# Patient Record
Sex: MALE | Race: WHITE | NOT HISPANIC OR LATINO | Employment: OTHER | URBAN - METROPOLITAN AREA
[De-identification: names, ages, dates, MRNs, and addresses within clinical notes are randomized per-mention and may not be internally consistent; named-entity substitution may affect disease eponyms.]

---

## 2017-09-06 ENCOUNTER — ALLSCRIPTS OFFICE VISIT (OUTPATIENT)
Dept: OTHER | Facility: OTHER | Age: 77
End: 2017-09-06

## 2017-09-06 LAB
BILIRUB UR QL STRIP: NORMAL
CLARITY UR: NORMAL
COLOR UR: YELLOW
GLUCOSE (HISTORICAL): NORMAL
HGB UR QL STRIP.AUTO: NORMAL
KETONES UR STRIP-MCNC: NORMAL MG/DL
LEUKOCYTE ESTERASE UR QL STRIP: NORMAL
NITRITE UR QL STRIP: NORMAL
PH UR STRIP.AUTO: 6 [PH]
PROT UR STRIP-MCNC: NORMAL MG/DL
SP GR UR STRIP.AUTO: 1.02
UROBILINOGEN UR QL STRIP.AUTO: 0.2

## 2018-01-13 VITALS
DIASTOLIC BLOOD PRESSURE: 64 MMHG | WEIGHT: 202 LBS | HEIGHT: 65 IN | SYSTOLIC BLOOD PRESSURE: 122 MMHG | BODY MASS INDEX: 33.66 KG/M2

## 2018-08-20 DIAGNOSIS — N13.8 BPH WITH OBSTRUCTION/LOWER URINARY TRACT SYMPTOMS: Primary | ICD-10-CM

## 2018-08-20 DIAGNOSIS — N40.1 BPH WITH OBSTRUCTION/LOWER URINARY TRACT SYMPTOMS: Primary | ICD-10-CM

## 2018-08-20 RX ORDER — FINASTERIDE 5 MG/1
TABLET, FILM COATED ORAL
Qty: 90 TABLET | Refills: 3 | Status: SHIPPED | OUTPATIENT
Start: 2018-08-20 | End: 2018-09-05 | Stop reason: SDUPTHER

## 2018-08-27 RX ORDER — CETIRIZINE HYDROCHLORIDE 10 MG/1
10 TABLET ORAL
COMMUNITY
Start: 2014-06-25

## 2018-08-27 RX ORDER — SIMVASTATIN 20 MG
10 TABLET ORAL DAILY
COMMUNITY
Start: 2018-06-22

## 2018-08-27 RX ORDER — MONTELUKAST SODIUM 10 MG/1
TABLET ORAL DAILY
COMMUNITY
Start: 2018-07-18

## 2018-08-27 RX ORDER — FLUTICASONE PROPIONATE 50 MCG
SPRAY, SUSPENSION (ML) NASAL
COMMUNITY
Start: 2018-08-10

## 2018-08-27 RX ORDER — MULTIVIT-MIN/IRON/FOLIC ACID/K 18-600-40
CAPSULE ORAL DAILY
COMMUNITY

## 2018-08-27 RX ORDER — ALBUTEROL SULFATE 2.5 MG/3ML
2.5 SOLUTION RESPIRATORY (INHALATION) EVERY 4 HOURS
COMMUNITY
Start: 2018-04-06 | End: 2019-04-06

## 2018-08-27 RX ORDER — FLUTICASONE PROPIONATE 44 MCG
AEROSOL WITH ADAPTER (GRAM) INHALATION
COMMUNITY
Start: 2018-08-10

## 2018-09-05 ENCOUNTER — OFFICE VISIT (OUTPATIENT)
Dept: UROLOGY | Facility: MEDICAL CENTER | Age: 78
End: 2018-09-05
Payer: MEDICARE

## 2018-09-05 VITALS
DIASTOLIC BLOOD PRESSURE: 60 MMHG | WEIGHT: 187 LBS | BODY MASS INDEX: 31.16 KG/M2 | SYSTOLIC BLOOD PRESSURE: 126 MMHG | HEIGHT: 65 IN

## 2018-09-05 DIAGNOSIS — N13.8 BPH WITH OBSTRUCTION/LOWER URINARY TRACT SYMPTOMS: Primary | ICD-10-CM

## 2018-09-05 DIAGNOSIS — N40.1 BPH WITH OBSTRUCTION/LOWER URINARY TRACT SYMPTOMS: Primary | ICD-10-CM

## 2018-09-05 LAB
SL AMB  POCT GLUCOSE, UA: NORMAL
SL AMB LEUKOCYTE ESTERASE,UA: NORMAL
SL AMB POCT BILIRUBIN,UA: NORMAL
SL AMB POCT BLOOD,UA: NORMAL
SL AMB POCT CLARITY,UA: CLEAR
SL AMB POCT COLOR,UA: YELLOW
SL AMB POCT KETONES,UA: NORMAL
SL AMB POCT NITRITE,UA: NORMAL
SL AMB POCT PH,UA: 7
SL AMB POCT SPECIFIC GRAVITY,UA: 1.02
SL AMB POCT URINE PROTEIN: NORMAL
SL AMB POCT UROBILINOGEN: 0.2

## 2018-09-05 PROCEDURE — 81003 URINALYSIS AUTO W/O SCOPE: CPT | Performed by: UROLOGY

## 2018-09-05 PROCEDURE — 99214 OFFICE O/P EST MOD 30 MIN: CPT | Performed by: UROLOGY

## 2018-09-05 RX ORDER — FINASTERIDE 5 MG/1
5 TABLET, FILM COATED ORAL DAILY
Qty: 90 TABLET | Refills: 3 | Status: SHIPPED | OUTPATIENT
Start: 2018-09-05 | End: 2019-11-05 | Stop reason: SDUPTHER

## 2018-09-05 NOTE — PATIENT INSTRUCTIONS
Finasteride (By mouth)   Finasteride (fin-AS-ter-colin)  Treats benign prostatic hyperplasia (enlarged prostate)  Also treats hair loss in men  Brand Name(s): Propecia, Proscar   There may be other brand names for this medicine  When This Medicine Should Not Be Used: You should not use this medicine if you have had an allergic reaction to finasteride  Women and children should not use this medicine  How to Use This Medicine:   Tablet  · Your doctor will tell you how much medicine to use  Do not use more than directed  · You may take this medicine with or without food  · Take this medicine at the same time each day  · For hair loss, you may need to take this medicine for 3 months or longer before you see an effect  · For an enlarged prostate, you may need to take this medicine for up to 6 months to see the full effect  · Read and follow the patient instructions that come with this medicine  Talk to your doctor or pharmacist if you have any questions  If a dose is missed:   · If you miss a dose or forget to take your medicine, skip the missed dose and take your next regular dose  Do not use extra medicine to make up for a missed dose  How to Store and Dispose of This Medicine:   · Store the medicine in a closed container at room temperature, away from heat, moisture, and direct light  · Ask your pharmacist, doctor, or health caregiver about the best way to dispose of any outdated medicine or medicine no longer needed  · Keep all medicine out of the reach of children  Never share your medicine with anyone  Drugs and Foods to Avoid:      Ask your doctor or pharmacist before using any other medicine, including over-the-counter medicines, vitamins, and herbal products  Warnings While Using This Medicine:   · Women who are pregnant or may become pregnant should avoid handling or touching this medicine  This medicine can get into the body through the skin and may harm an unborn male baby   If any of this medicine gets on the skin of a woman, wash the area immediately with soap and water  · Make sure your doctor knows if you have liver disease or severe problems urinating  · This medicine will not cure an enlarged prostate problem or male pattern baldness  Once you stop using this medicine, the prostate will begin to grow again within a few months and any new hair will be lost within 12 months  · This medicine may cause changes to the breast tissue  Tell your doctor if you have any lumps, pain, tenderness, or an enlargement of the breasts while using this medicine  · This medicine will not prevent prostate cancer but may increase your risk of developing high-grade prostate cancer  Tell your doctor if you have concerns about this risk  · This medicine may affect the results of the prostate specific antigen (PSA) test, which may be used to detect prostate cancer  Make sure you tell all of your doctors that you are using this medicine  · This medicine may cause a decrease in the amount of semen you ejaculate during sex  This will not affect your sperm count or your ability to have children  · Your doctor will check your progress and the effects of this medicine at regular visits  Keep all appointments  Blood tests may be needed to check for unwanted effects  Possible Side Effects While Using This Medicine:   Call your doctor right away if you notice any of these side effects:  · Allergic reaction: Itching or hives, swelling in your face or hands, swelling or tingling in your mouth or throat, chest tightness, trouble breathing  · Breast swelling, pain, or tenderness  · Lightheadedness, dizziness, or fainting  · Lumps in the breast or under the arm  · Pain in the testicles  · Swelling in your hands, ankles, or feet  If you notice these less serious side effects, talk with your doctor:   · Decrease in the amount of semen  · Loss of interest in sex or the ability to have sex (impotence)    · Skin rash   · Sleepiness or unusual drowsiness  · Stuffy or runny nose  · Trouble having or keeping an erection  · Weakness  If you notice other side effects that you think are caused by this medicine, tell your doctor  Call your doctor for medical advice about side effects  You may report side effects to FDA at 9-925-FDA-1365  © 2017 [employer] Information is for End User's use only and may not be sold, redistributed or otherwise used for commercial purposes  The above information is an  only  It is not intended as medical advice for individual conditions or treatments  Talk to your doctor, nurse or pharmacist before following any medical regimen to see if it is safe and effective for you

## 2018-09-05 NOTE — LETTER
September 5, 2018     Laura Gunter [address] [address]    Patient: Murray Melendrez   YOB: 1940   Date of Visit: 9/5/2018       Dear Dr Katharina Long:    Thank you for referring Murray Melendrez to me for evaluation  Below are my notes for this consultation  If you have questions, please do not hesitate to call me  I look forward to following your patient along with you  Sincerely,        Marvis Bence, MD        CC: No Recipients  Marvis Bence, MD  9/5/2018 10:06 AM  Sign at close encounter  Assessment/Plan:    BPH with obstruction/lower urinary tract symptoms  The patient is pleased with his voiding pattern  His AUA symptom score is 6  Urinalysis is negative  PSA was 1 0 and serum creatinine normal on August 1, 2018  His prescription for finasteride was refilled  He will return in 1 year and we will recheck PSA at that time  Diagnoses and all orders for this visit:    BPH with obstruction/lower urinary tract symptoms  -     POCT urine dip auto non-scope  -     finasteride (PROSCAR) 5 mg tablet; Take 1 tablet (5 mg total) by mouth daily  -     PSA Total, Diagnostic; Future          Subjective:      Patient ID: Murray Melendrez is a 65 y o  male  Chief complaint:  Lower tract symptoms      Benign Prostatic Hypertrophy   This is a chronic problem  The current episode started more than 1 year ago  The problem is unchanged  Irritative symptoms include nocturia and urgency  Irritative symptoms do not include frequency  Obstructive symptoms include a slower stream  Obstructive symptoms do not include dribbling, incomplete emptying, an intermittent stream, straining or a weak stream  Pertinent negatives include no chills, dysuria, genital pain, hematuria, hesitancy, nausea or vomiting  AUA score is 0-7  Nothing aggravates the symptoms  Past treatments include finasteride  The treatment provided moderate relief   He has been using treatment for 2 or more years  His urgency is mild and there is no incontinence  Occasionally he notes and itching in the urethra at night  This usually resolves on its own  Nocturia is variable from 0-2 X  He is pleased with his voiding pattern at this time  The following portions of the patient's history were reviewed and updated as appropriate: allergies, current medications, past family history, past medical history, past social history, past surgical history and problem list     Review of Systems   Constitutional: Negative for chills, diaphoresis, fatigue and fever  HENT: Negative  Eyes: Negative  Respiratory: Negative  Cardiovascular: Negative  Gastrointestinal: Negative  Negative for nausea and vomiting  Endocrine: Negative  Genitourinary: Positive for nocturia and urgency  Negative for dysuria, frequency, hematuria, hesitancy and incomplete emptying  Musculoskeletal: Negative  Skin: Negative  Allergic/Immunologic: Negative  Neurological: Negative  Hematological: Negative  Psychiatric/Behavioral: Negative  Objective:      /60 (BP Location: Left arm, Patient Position: Sitting)   Ht 5' 5" (1 651 m)   Wt 84 8 kg (187 lb)   BMI 31 12 kg/m²           Physical Exam   Constitutional: He is oriented to person, place, and time  He appears well-developed and well-nourished  HENT:   Head: Normocephalic and atraumatic  Eyes: Conjunctivae are normal    Neck: Neck supple  Cardiovascular: Normal rate  Pulmonary/Chest: Effort normal    Abdominal: Soft  Bowel sounds are normal  He exhibits no distension and no mass  There is no tenderness  There is no rebound, no guarding and no CVA tenderness  No hernia   Genitourinary: Rectum normal, testes normal and penis normal  Right testis shows no mass  Left testis shows no mass  No phimosis or hypospadias  Genitourinary Comments: Testes descended and normal to palpation    Prostate 2 5 X enlarged and palpably benign     No nodule or induration is noted  The prostate is symmetric  Musculoskeletal: He exhibits no edema  Neurological: He is alert and oriented to person, place, and time  Skin: Skin is warm and dry  Psychiatric: He has a normal mood and affect  His behavior is normal  Judgment and thought content normal    Vitals reviewed  AUA SYMPTOM SCORE      Most Recent Value   AUA SYMPTOM SCORE   How often have you had a sensation of not emptying your bladder completely after you finished urinating? 1   How often have you had to urinate again less than two hours after you finished urinating? 1   How often have you found you stopped and started again several times when you urinate? 1   How often have you found it difficult to postpone urination? 0   How often have you had a weak urinary stream?  1   How often have you had to push or strain to begin urination? 0   How many times did you most typically get up to urinate from the time you went to bed at night until the time you got up in the morning?   2   Quality of Life: If you were to spend the rest of your life with your urinary condition just the way it is now, how would you feel about that?  0   AUA SYMPTOM SCORE  6

## 2018-09-05 NOTE — PROGRESS NOTES
Assessment/Plan:    BPH with obstruction/lower urinary tract symptoms  The patient is pleased with his voiding pattern  His AUA symptom score is 6  Urinalysis is negative  PSA was 1 0 and serum creatinine normal on August 1, 2018  His prescription for finasteride was refilled  He will return in 1 year and we will recheck PSA at that time  Diagnoses and all orders for this visit:    BPH with obstruction/lower urinary tract symptoms  -     POCT urine dip auto non-scope  -     finasteride (PROSCAR) 5 mg tablet; Take 1 tablet (5 mg total) by mouth daily  -     PSA Total, Diagnostic; Future          Subjective:      Patient ID: Emanuel Fernandez is a 65 y o  male  Chief complaint:  Lower tract symptoms      Benign Prostatic Hypertrophy   This is a chronic problem  The current episode started more than 1 year ago  The problem is unchanged  Irritative symptoms include nocturia and urgency  Irritative symptoms do not include frequency  Obstructive symptoms include a slower stream  Obstructive symptoms do not include dribbling, incomplete emptying, an intermittent stream, straining or a weak stream  Pertinent negatives include no chills, dysuria, genital pain, hematuria, hesitancy, nausea or vomiting  AUA score is 0-7  Nothing aggravates the symptoms  Past treatments include finasteride  The treatment provided moderate relief  He has been using treatment for 2 or more years  His urgency is mild and there is no incontinence  Occasionally he notes and itching in the urethra at night  This usually resolves on its own  Nocturia is variable from 0-2 X  He is pleased with his voiding pattern at this time      The following portions of the patient's history were reviewed and updated as appropriate: allergies, current medications, past family history, past medical history, past social history, past surgical history and problem list     Review of Systems   Constitutional: Negative for chills, diaphoresis, fatigue and fever    HENT: Negative  Eyes: Negative  Respiratory: Negative  Cardiovascular: Negative  Gastrointestinal: Negative  Negative for nausea and vomiting  Endocrine: Negative  Genitourinary: Positive for nocturia and urgency  Negative for dysuria, frequency, hematuria, hesitancy and incomplete emptying  Musculoskeletal: Negative  Skin: Negative  Allergic/Immunologic: Negative  Neurological: Negative  Hematological: Negative  Psychiatric/Behavioral: Negative  Objective:      /60 (BP Location: Left arm, Patient Position: Sitting)   Ht 5' 5" (1 651 m)   Wt 84 8 kg (187 lb)   BMI 31 12 kg/m²          Physical Exam   Constitutional: He is oriented to person, place, and time  He appears well-developed and well-nourished  HENT:   Head: Normocephalic and atraumatic  Eyes: Conjunctivae are normal    Neck: Neck supple  Cardiovascular: Normal rate  Pulmonary/Chest: Effort normal    Abdominal: Soft  Bowel sounds are normal  He exhibits no distension and no mass  There is no tenderness  There is no rebound, no guarding and no CVA tenderness  No hernia   Genitourinary: Rectum normal, testes normal and penis normal  Right testis shows no mass  Left testis shows no mass  No phimosis or hypospadias  Genitourinary Comments: Testes descended and normal to palpation    Prostate 2 5 X enlarged and palpably benign  No nodule or induration is noted  The prostate is symmetric  Musculoskeletal: He exhibits no edema  Neurological: He is alert and oriented to person, place, and time  Skin: Skin is warm and dry  Psychiatric: He has a normal mood and affect  His behavior is normal  Judgment and thought content normal    Vitals reviewed  AUA SYMPTOM SCORE      Most Recent Value   AUA SYMPTOM SCORE   How often have you had a sensation of not emptying your bladder completely after you finished urinating?   1   How often have you had to urinate again less than two hours after you finished urinating? 1   How often have you found you stopped and started again several times when you urinate? 1   How often have you found it difficult to postpone urination? 0   How often have you had a weak urinary stream?  1   How often have you had to push or strain to begin urination? 0   How many times did you most typically get up to urinate from the time you went to bed at night until the time you got up in the morning?   2   Quality of Life: If you were to spend the rest of your life with your urinary condition just the way it is now, how would you feel about that?  0   AUA SYMPTOM SCORE  6

## 2018-09-05 NOTE — ASSESSMENT & PLAN NOTE
The patient is pleased with his voiding pattern  His AUA symptom score is 6  Urinalysis is negative  PSA was 1 0 and serum creatinine normal on August 1, 2018  His prescription for finasteride was refilled  He will return in 1 year and we will recheck PSA at that time

## 2018-09-06 DIAGNOSIS — N40.1 ENLARGED PROSTATE WITH LOWER URINARY TRACT SYMPTOMS (LUTS): ICD-10-CM

## 2019-07-23 LAB — HBA1C MFR BLD HPLC: 6.1 %

## 2019-11-05 ENCOUNTER — OFFICE VISIT (OUTPATIENT)
Dept: UROLOGY | Facility: MEDICAL CENTER | Age: 79
End: 2019-11-05
Payer: MEDICARE

## 2019-11-05 VITALS
WEIGHT: 194 LBS | HEART RATE: 75 BPM | SYSTOLIC BLOOD PRESSURE: 118 MMHG | DIASTOLIC BLOOD PRESSURE: 58 MMHG | HEIGHT: 66 IN | BODY MASS INDEX: 31.18 KG/M2

## 2019-11-05 DIAGNOSIS — N40.1 BPH WITH OBSTRUCTION/LOWER URINARY TRACT SYMPTOMS: Primary | ICD-10-CM

## 2019-11-05 DIAGNOSIS — N13.8 BPH WITH OBSTRUCTION/LOWER URINARY TRACT SYMPTOMS: Primary | ICD-10-CM

## 2019-11-05 PROCEDURE — 99214 OFFICE O/P EST MOD 30 MIN: CPT | Performed by: UROLOGY

## 2019-11-05 PROCEDURE — 81003 URINALYSIS AUTO W/O SCOPE: CPT | Performed by: UROLOGY

## 2019-11-05 RX ORDER — VARENICLINE TARTRATE 25 MG
KIT ORAL
COMMUNITY
Start: 2019-10-07 | End: 2019-11-06

## 2019-11-05 RX ORDER — ALBUTEROL SULFATE 90 UG/1
2 AEROSOL, METERED RESPIRATORY (INHALATION) EVERY 4 HOURS PRN
COMMUNITY

## 2019-11-05 RX ORDER — FINASTERIDE 5 MG/1
5 TABLET, FILM COATED ORAL DAILY
Qty: 90 TABLET | Refills: 3 | Status: SHIPPED | OUTPATIENT
Start: 2019-11-05 | End: 2020-09-21

## 2019-11-05 NOTE — LETTER
November 5, 2019     Brigida Torres MD  [address] [address]    Patient: Jorge Rodríguez   YOB: 1940   Date of Visit: 11/5/2019       Dear Dr Cait Foster:    Thank you for referring Jorge Rodríguez to me for evaluation  Below are my notes for this consultation  If you have questions, please do not hesitate to call me  I look forward to following your patient along with you  Sincerely,        Raji Bravo MD        CC: No Recipients  Raji Bravo MD  11/5/2019  3:07 PM  Sign at close encounter  Assessment/Plan:    BPH with obstruction/lower urinary tract symptoms  AUA symptom score is 8 on finasteride  He is pleased with his voiding pattern  Urinalysis is negative  PSA is normal at 1 01 (July 23, 2019)  The patient is content to continue medical therapy with finasteride  At his request we will repeat a PSA in 1 year  He will return in 1 year for follow-up  He will call should there be any worsening of his urinary symptoms  Diagnoses and all orders for this visit:    BPH with obstruction/lower urinary tract symptoms  -     POCT urine dip auto non-scope  -     finasteride (PROSCAR) 5 mg tablet; Take 1 tablet (5 mg total) by mouth daily  -     PSA Total, Diagnostic; Future    Other orders  -     albuterol (PROVENTIL HFA,VENTOLIN HFA) 90 mcg/act inhaler; Inhale 2 puffs every 4 (four) hours as needed  -     varenicline (CHANTIX YIN) 0 5 MG X 11 & 1 MG X 42 tablet; Use as directed  Give with meals and with a full glass of water  Subjective:      Patient ID: Jorge Rodríguez is a 77 y o  male  Benign Prostatic Hypertrophy   This is a chronic problem  The current episode started more than 1 year ago  The problem is unchanged  Irritative symptoms do not include frequency or urgency  Nocturia: Nocturia x 1   Obstructive symptoms include dribbling and a slower stream  Obstructive symptoms do not include incomplete emptying, an intermittent stream, straining or a weak stream  Pertinent negatives include no chills, dysuria, genital pain, hematuria, hesitancy, nausea or vomiting  AUA score is 8-19  His sexual activity is non-contributory to the current illness  Nothing aggravates the symptoms  Past treatments include finasteride  The treatment provided moderate relief  He has been using treatment for 2 or more years  The following portions of the patient's history were reviewed and updated as appropriate: allergies, current medications, past family history, past medical history, past social history, past surgical history and problem list     Review of Systems   Constitutional: Negative  Negative for chills, diaphoresis, fatigue and fever  HENT: Negative  Eyes: Negative  Respiratory: Positive for shortness of breath  Cardiovascular: Negative  Gastrointestinal: Negative  Negative for nausea and vomiting  Endocrine: Negative  Genitourinary: Negative for dysuria, frequency, hematuria, hesitancy, incomplete emptying and urgency  Nocturia: Nocturia x 1  See HPI   Musculoskeletal: Positive for arthralgias  Skin: Negative  Allergic/Immunologic: Negative  Neurological: Negative  Hematological: Negative  Psychiatric/Behavioral: Negative  AUA SYMPTOM SCORE      Most Recent Value   AUA SYMPTOM SCORE   How often have you had a sensation of not emptying your bladder completely after you finished urinating? 0   How often have you had to urinate again less than two hours after you finished urinating? 1   How often have you found you stopped and started again several times when you urinate? 1   How often have you found it difficult to postpone urination? 2   How often have you had a weak urinary stream?  2   How often have you had to push or strain to begin urination? 1   How many times did you most typically get up to urinate from the time you went to bed at night until the time you got up in the morning?   1 Quality of Life: If you were to spend the rest of your life with your urinary condition just the way it is now, how would you feel about that?  1   AUA SYMPTOM SCORE  8        Objective:      /58 (BP Location: Left arm, Patient Position: Sitting, Cuff Size: Adult)   Pulse 75   Ht 5' 6" (1 676 m)   Wt 88 kg (194 lb)   BMI 31 31 kg/m²           Physical Exam   Constitutional: He is oriented to person, place, and time  He appears well-developed and well-nourished  HENT:   Head: Normocephalic and atraumatic  Eyes: Conjunctivae are normal    Neck: Neck supple  Cardiovascular: Normal rate  Pulmonary/Chest: Effort normal    Abdominal: Soft  Bowel sounds are normal  He exhibits no distension and no mass  There is no tenderness  There is no rebound, no guarding and no CVA tenderness  Hernia confirmed negative in the right inguinal area and confirmed negative in the left inguinal area  Genitourinary: Rectum normal, testes normal and penis normal  Right testis shows no mass  Left testis shows no mass  Uncircumcised  No phimosis, paraphimosis, hypospadias, penile erythema or penile tenderness  No discharge found  Genitourinary Comments: Prostate 2 5 X enlarged and palpably benign  No nodule or induration is noted  Musculoskeletal: He exhibits no edema  Neurological: He is alert and oriented to person, place, and time  Skin: Skin is warm and dry  Psychiatric: He has a normal mood and affect  His behavior is normal  Judgment and thought content normal    Vitals reviewed

## 2019-11-05 NOTE — ASSESSMENT & PLAN NOTE
AUA symptom score is 8 on finasteride  He is pleased with his voiding pattern  Urinalysis is negative  PSA is normal at 1 01 (July 23, 2019)  The patient is content to continue medical therapy with finasteride  At his request we will repeat a PSA in 1 year  He will return in 1 year for follow-up  He will call should there be any worsening of his urinary symptoms

## 2019-11-05 NOTE — PATIENT INSTRUCTIONS
Benign Prostatic Hypertrophy   WHAT YOU NEED TO KNOW:   Benign prostatic hypertrophy (BPH) is a condition that causes your prostate gland to grow larger than normal  The prostate gland is the male sex gland that produces a fluid that is part of semen  It is about the size of a walnut and it is located under the bladder  As the prostate grows, it can squeeze the urethra  This can block urine flow and cause urinary problems  DISCHARGE INSTRUCTIONS:   Medicines:   · Alpha blockers: This medicine relaxes the muscles in your prostate and bladder  It may help you urinate more easily  · 5 alpha reductase inhibitors: These medicines block the production of a hormone that causes the prostate to get larger  It may help slow the growth of the prostate or shrink the prostate  · Take your medicine as directed  Contact your healthcare provider if you think your medicine is not helping or if you have side effects  Tell him or her if you are allergic to any medicine  Keep a list of the medicines, vitamins, and herbs you take  Include the amounts, and when and why you take them  Bring the list or the pill bottles to follow-up visits  Carry your medicine list with you in case of an emergency  Follow up with your healthcare provider as directed:  Write down your questions so you remember to ask them during your visits  Manage BPH:   · Do not let your bladder get too full before you empty it  Urinate when you feel the urge  · Limit alcohol  Do not drink large amounts of any liquid at one time  · Decrease the amount of salt you eat  Examples of salty foods are chips, cured meats, and canned soups  Do not use table salt  · Healthcare providers may tell you not to eat spicy foods such as chilli peppers  This may help you find out if spicy food makes your BPH symptoms worse  · You may have sex if you feel well  Contact your healthcare provider if:   · There is a large amount of blood in your urine  · Your signs and symptoms get worse  · You have a fever  · You have questions or concerns about your condition or care  Seek care immediately if:   · You are unable to urinate  · Your bladder feels very full and painful  © 2017 [employer] Information is for End User's use only and may not be sold, redistributed or otherwise used for commercial purposes  All illustrations and images included in CareNotes® are the copyrighted property of [employer]  or [employer]  The above information is an  only  It is not intended as medical advice for individual conditions or treatments  Talk to your doctor, nurse or pharmacist before following any medical regimen to see if it is safe and effective for you

## 2019-11-05 NOTE — PROGRESS NOTES
Assessment/Plan:    BPH with obstruction/lower urinary tract symptoms  AUA symptom score is 8 on finasteride  He is pleased with his voiding pattern  Urinalysis is negative  PSA is normal at 1 01 (July 23, 2019)  The patient is content to continue medical therapy with finasteride  At his request we will repeat a PSA in 1 year  He will return in 1 year for follow-up  He will call should there be any worsening of his urinary symptoms  Diagnoses and all orders for this visit:    BPH with obstruction/lower urinary tract symptoms  -     POCT urine dip auto non-scope  -     finasteride (PROSCAR) 5 mg tablet; Take 1 tablet (5 mg total) by mouth daily  -     PSA Total, Diagnostic; Future    Other orders  -     albuterol (PROVENTIL HFA,VENTOLIN HFA) 90 mcg/act inhaler; Inhale 2 puffs every 4 (four) hours as needed  -     varenicline (CHANTIX YIN) 0 5 MG X 11 & 1 MG X 42 tablet; Use as directed  Give with meals and with a full glass of water  Subjective:      Patient ID: Gifty Barrett is a 77 y o  male  Benign Prostatic Hypertrophy   This is a chronic problem  The current episode started more than 1 year ago  The problem is unchanged  Irritative symptoms do not include frequency or urgency  Nocturia: Nocturia x 1  Obstructive symptoms include dribbling and a slower stream  Obstructive symptoms do not include incomplete emptying, an intermittent stream, straining or a weak stream  Pertinent negatives include no chills, dysuria, genital pain, hematuria, hesitancy, nausea or vomiting  AUA score is 8-19  His sexual activity is non-contributory to the current illness  Nothing aggravates the symptoms  Past treatments include finasteride  The treatment provided moderate relief  He has been using treatment for 2 or more years         The following portions of the patient's history were reviewed and updated as appropriate: allergies, current medications, past family history, past medical history, past social history, past surgical history and problem list     Review of Systems   Constitutional: Negative  Negative for chills, diaphoresis, fatigue and fever  HENT: Negative  Eyes: Negative  Respiratory: Positive for shortness of breath  Cardiovascular: Negative  Gastrointestinal: Negative  Negative for nausea and vomiting  Endocrine: Negative  Genitourinary: Negative for dysuria, frequency, hematuria, hesitancy, incomplete emptying and urgency  Nocturia: Nocturia x 1  See HPI   Musculoskeletal: Positive for arthralgias  Skin: Negative  Allergic/Immunologic: Negative  Neurological: Negative  Hematological: Negative  Psychiatric/Behavioral: Negative  AUA SYMPTOM SCORE      Most Recent Value   AUA SYMPTOM SCORE   How often have you had a sensation of not emptying your bladder completely after you finished urinating? 0   How often have you had to urinate again less than two hours after you finished urinating? 1   How often have you found you stopped and started again several times when you urinate? 1   How often have you found it difficult to postpone urination? 2   How often have you had a weak urinary stream?  2   How often have you had to push or strain to begin urination? 1   How many times did you most typically get up to urinate from the time you went to bed at night until the time you got up in the morning? 1   Quality of Life: If you were to spend the rest of your life with your urinary condition just the way it is now, how would you feel about that?  1   AUA SYMPTOM SCORE  8        Objective:      /58 (BP Location: Left arm, Patient Position: Sitting, Cuff Size: Adult)   Pulse 75   Ht 5' 6" (1 676 m)   Wt 88 kg (194 lb)   BMI 31 31 kg/m²          Physical Exam   Constitutional: He is oriented to person, place, and time  He appears well-developed and well-nourished  HENT:   Head: Normocephalic and atraumatic     Eyes: Conjunctivae are normal    Neck: Neck supple  Cardiovascular: Normal rate  Pulmonary/Chest: Effort normal    Abdominal: Soft  Bowel sounds are normal  He exhibits no distension and no mass  There is no tenderness  There is no rebound, no guarding and no CVA tenderness  Hernia confirmed negative in the right inguinal area and confirmed negative in the left inguinal area  Genitourinary: Rectum normal, testes normal and penis normal  Right testis shows no mass  Left testis shows no mass  Uncircumcised  No phimosis, paraphimosis, hypospadias, penile erythema or penile tenderness  No discharge found  Genitourinary Comments: Prostate 2 5 X enlarged and palpably benign  No nodule or induration is noted  Musculoskeletal: He exhibits no edema  Neurological: He is alert and oriented to person, place, and time  Skin: Skin is warm and dry  Psychiatric: He has a normal mood and affect  His behavior is normal  Judgment and thought content normal    Vitals reviewed

## 2020-07-29 LAB — HBA1C MFR BLD HPLC: 6.2 %

## 2020-09-19 DIAGNOSIS — N13.8 BPH WITH OBSTRUCTION/LOWER URINARY TRACT SYMPTOMS: ICD-10-CM

## 2020-09-19 DIAGNOSIS — N40.1 BPH WITH OBSTRUCTION/LOWER URINARY TRACT SYMPTOMS: ICD-10-CM

## 2020-09-21 RX ORDER — FINASTERIDE 5 MG/1
5 TABLET, FILM COATED ORAL DAILY
Qty: 90 TABLET | Refills: 3 | Status: SHIPPED | OUTPATIENT
Start: 2020-09-21 | End: 2021-09-23

## 2020-11-13 ENCOUNTER — OFFICE VISIT (OUTPATIENT)
Dept: UROLOGY | Facility: MEDICAL CENTER | Age: 80
End: 2020-11-13
Payer: MEDICARE

## 2020-11-13 VITALS
BODY MASS INDEX: 32.78 KG/M2 | TEMPERATURE: 97.6 F | WEIGHT: 204 LBS | DIASTOLIC BLOOD PRESSURE: 68 MMHG | HEIGHT: 66 IN | SYSTOLIC BLOOD PRESSURE: 122 MMHG

## 2020-11-13 DIAGNOSIS — N40.1 BPH WITH OBSTRUCTION/LOWER URINARY TRACT SYMPTOMS: Primary | ICD-10-CM

## 2020-11-13 DIAGNOSIS — R97.20 ELEVATED PSA: ICD-10-CM

## 2020-11-13 DIAGNOSIS — R31.21 ASYMPTOMATIC MICROSCOPIC HEMATURIA: ICD-10-CM

## 2020-11-13 DIAGNOSIS — N13.8 BPH WITH OBSTRUCTION/LOWER URINARY TRACT SYMPTOMS: Primary | ICD-10-CM

## 2020-11-13 PROCEDURE — 99214 OFFICE O/P EST MOD 30 MIN: CPT | Performed by: UROLOGY

## 2020-11-13 RX ORDER — FAMOTIDINE 20 MG/1
TABLET, FILM COATED ORAL
COMMUNITY
Start: 2020-08-12

## 2020-11-18 ENCOUNTER — LAB (OUTPATIENT)
Dept: LAB | Facility: MEDICAL CENTER | Age: 80
End: 2020-11-18
Attending: UROLOGY
Payer: MEDICARE

## 2020-11-18 ENCOUNTER — HOSPITAL ENCOUNTER (OUTPATIENT)
Dept: ULTRASOUND IMAGING | Facility: MEDICAL CENTER | Age: 80
Discharge: HOME | End: 2020-11-18
Attending: UROLOGY
Payer: MEDICARE

## 2020-11-18 DIAGNOSIS — N40.1 BPH WITH OBSTRUCTION/LOWER URINARY TRACT SYMPTOMS: ICD-10-CM

## 2020-11-18 DIAGNOSIS — N13.8 BPH WITH OBSTRUCTION/LOWER URINARY TRACT SYMPTOMS: ICD-10-CM

## 2020-11-18 DIAGNOSIS — R31.21 ASYMPTOMATIC MICROSCOPIC HEMATURIA: ICD-10-CM

## 2020-11-18 LAB — PSA SERPL-MCNC: 0.8 NG/ML (ref 0–4)

## 2020-11-18 PROCEDURE — 36415 COLL VENOUS BLD VENIPUNCTURE: CPT

## 2020-11-18 PROCEDURE — 76770 US EXAM ABDO BACK WALL COMP: CPT

## 2020-11-18 PROCEDURE — 84153 ASSAY OF PSA TOTAL: CPT

## 2020-11-24 ENCOUNTER — TELEPHONE (OUTPATIENT)
Dept: UROLOGY | Facility: MEDICAL CENTER | Age: 80
End: 2020-11-24

## 2021-01-18 ENCOUNTER — PROCEDURE VISIT (OUTPATIENT)
Dept: UROLOGY | Facility: MEDICAL CENTER | Age: 81
End: 2021-01-18
Payer: MEDICARE

## 2021-01-18 VITALS
DIASTOLIC BLOOD PRESSURE: 64 MMHG | WEIGHT: 204 LBS | SYSTOLIC BLOOD PRESSURE: 138 MMHG | BODY MASS INDEX: 32.78 KG/M2 | HEIGHT: 66 IN

## 2021-01-18 DIAGNOSIS — N40.1 BPH WITH OBSTRUCTION/LOWER URINARY TRACT SYMPTOMS: Primary | ICD-10-CM

## 2021-01-18 DIAGNOSIS — N13.8 BPH WITH OBSTRUCTION/LOWER URINARY TRACT SYMPTOMS: Primary | ICD-10-CM

## 2021-01-18 DIAGNOSIS — R31.21 ASYMPTOMATIC MICROSCOPIC HEMATURIA: ICD-10-CM

## 2021-01-18 LAB
SL AMB  POCT GLUCOSE, UA: NORMAL
SL AMB LEUKOCYTE ESTERASE,UA: NORMAL
SL AMB POCT BILIRUBIN,UA: NORMAL
SL AMB POCT BLOOD,UA: NORMAL
SL AMB POCT CLARITY,UA: CLEAR
SL AMB POCT COLOR,UA: YELLOW
SL AMB POCT KETONES,UA: NORMAL
SL AMB POCT NITRITE,UA: NORMAL
SL AMB POCT PH,UA: 5.5
SL AMB POCT SPECIFIC GRAVITY,UA: 1.03
SL AMB POCT URINE PROTEIN: NORMAL
SL AMB POCT UROBILINOGEN: 0.2

## 2021-01-18 PROCEDURE — 52000 CYSTOURETHROSCOPY: CPT | Performed by: UROLOGY

## 2021-01-18 PROCEDURE — 99213 OFFICE O/P EST LOW 20 MIN: CPT | Performed by: UROLOGY

## 2021-01-18 PROCEDURE — 81003 URINALYSIS AUTO W/O SCOPE: CPT | Performed by: UROLOGY

## 2021-01-18 NOTE — PATIENT INSTRUCTIONS

## 2021-01-18 NOTE — ASSESSMENT & PLAN NOTE
AUA symptom score is 13 on finasteride  He is satisfied with his voiding pattern  We will continue to follow on present therapy  He will return in 1 year

## 2021-01-18 NOTE — ASSESSMENT & PLAN NOTE
Renal ultrasound reveals the upper tracts to be normal   Cystoscopy does not reveal any bladder tumor  The prostate is large and friable and is likely source of microscopic hematuria  We will continue to follow

## 2021-01-18 NOTE — LETTER
January 18, 2021     Karlene Alan MD  [facility] [id number]    Patient: Pati Dove   YOB: 1940   Date of Visit: 1/18/2021       Dear Dr Juan Sanchez:    Thank you for referring Pati Dove to me for evaluation  Below are my notes for this consultation  If you have questions, please do not hesitate to call me  I look forward to following your patient along with you  Sincerely,        Calvin Dorantes MD        CC: No Recipients  Calvin Dorantes MD  1/18/2021 10:53 AM  Sign when Signing Visit  Assessment/Plan:    Asymptomatic microscopic hematuria  Renal ultrasound reveals the upper tracts to be normal   Cystoscopy does not reveal any bladder tumor  The prostate is large and friable and is likely source of microscopic hematuria  We will continue to follow  BPH with obstruction/lower urinary tract symptoms    AUA symptom score is 13 on finasteride  He is satisfied with his voiding pattern  We will continue to follow on present therapy  He will return in 1 year  Diagnoses and all orders for this visit:    BPH with obstruction/lower urinary tract symptoms  -     POCT urine dip auto non-scope    Asymptomatic microscopic hematuria  -     POCT urine dip auto non-scope  -     Cystoscopy          Subjective:      Patient ID: Pati Dove is a 79 y o  male  Benign Prostatic Hypertrophy  This is a chronic problem  The problem is unchanged  Irritative symptoms do not include frequency, nocturia (NOcturia x 1) or urgency  Obstructive symptoms include dribbling, an intermittent stream, a slower stream and a weak stream  Obstructive symptoms do not include incomplete emptying or straining  Associated symptoms include hematuria  Pertinent negatives include no chills, dysuria, genital pain, hesitancy or vomiting  AUA score is 8-19  His sexual activity is non-contributory to the current illness  Nothing aggravates the symptoms   Past treatments include finasteride  The treatment provided moderate relief  He has been using treatment for 2 or more years  He returns for evaluation of Microhematuria  He remains satisfied with his voiding pattern  The following portions of the patient's history were reviewed and updated as appropriate: allergies, current medications, past family history, past medical history, past social history, past surgical history and problem list     Review of Systems   Constitutional: Negative for chills, diaphoresis, fatigue and fever  HENT: Negative  Eyes: Negative  Respiratory: Negative  Cardiovascular: Negative  Gastrointestinal: Negative  Negative for vomiting  Endocrine: Negative  Genitourinary: Positive for hematuria  Negative for dysuria, frequency, hesitancy, incomplete emptying, nocturia (NOcturia x 1) and urgency  See HPI   Musculoskeletal: Negative  Skin: Negative  Allergic/Immunologic: Negative  Neurological: Negative  Hematological: Negative  Psychiatric/Behavioral: Negative  AUA SYMPTOM SCORE      Most Recent Value   AUA SYMPTOM SCORE   How often have you had a sensation of not emptying your bladder completely after you finished urinating? 1   How often have you had to urinate again less than two hours after you finished urinating? 2   How often have you found you stopped and started again several times when you urinate? 4   How often have you found it difficult to postpone urination? 0   How often have you had a weak urinary stream?  5   How often have you had to push or strain to begin urination? 0   How many times did you most typically get up to urinate from the time you went to bed at night until the time you got up in the morning?   1   Quality of Life: If you were to spend the rest of your life with your urinary condition just the way it is now, how would you feel about that?  2   AUA SYMPTOM SCORE  13        Objective:      /64 (BP Location: Left arm, Patient Position: Sitting, Cuff Size: Adult)   Ht 5' 6" (1 676 m)   Wt 92 5 kg (204 lb)   BMI 32 93 kg/m²          Physical Exam  Vitals signs reviewed  Constitutional:       General: He is not in acute distress  Appearance: Normal appearance  He is well-developed and normal weight  He is not ill-appearing, toxic-appearing or diaphoretic  HENT:      Head: Normocephalic and atraumatic  Eyes:      General: No scleral icterus  Conjunctiva/sclera: Conjunctivae normal    Neck:      Musculoskeletal: Neck supple  Cardiovascular:      Rate and Rhythm: Normal rate  Pulmonary:      Effort: Pulmonary effort is normal    Abdominal:      General: There is no distension  Palpations: There is no mass  Tenderness: There is no abdominal tenderness  There is no right CVA tenderness, left CVA tenderness, guarding or rebound  Hernia: No hernia is present  Comments: No hernia   Genitourinary:     Penis: Normal        Comments: Testes descended and normal to palpation  Skin:     General: Skin is warm and dry  Neurological:      Mental Status: He is alert and oriented to person, place, and time  Psychiatric:         Behavior: Behavior normal          Thought Content: Thought content normal          Judgment: Judgment normal             Cystoscopy     Date/Time 1/18/2021 10:50 AM     Performed by  Edris Primrose, MD     Authorized by Edris Primrose, MD      Universal Protocol:  Consent: Verbal consent obtained  Written consent obtained    Risks and benefits: risks, benefits and alternatives were discussed  Consent given by: patient  Patient understanding: patient states understanding of the procedure being performed  Required items: required blood products, implants, devices, and special equipment available  Patient identity confirmed: verbally with patient        Procedure Details:  Procedure type: cystoscopy    Patient tolerance: Patient tolerated the procedure well with no immediate complications    Additional Procedure Details:      Patient presents for cystoscopy  I have discussed the reasons for doing the test, and the potential risks and complications  Patient expressed understanding, and signed informed consent document  The patient was carefully  positioned supine on the examining table  Sterile preparation was performed on the urethra  Xylocaine jelly was instilled and left  Indwelling for the procedure  The 13 [employer] flexible cystoscope was passed with the following findings:      Urethra: normal without stricture    Prostate:  lateral lobes - bilobar obstruction                  median lobe -moderate   Prostate size estimated at 80Gm  Prostate is friable,   Vascular  and bleeds easily with cystoscopy  Bladder: moderate trabeculation, occasional small diverticula, no lesions, tumor, or stones  Orifices normal     Residual urine:- minimal    Patient tolerated the procedure well and was escorted from the examining table

## 2021-01-18 NOTE — PROGRESS NOTES
Assessment/Plan:    Asymptomatic microscopic hematuria  Renal ultrasound reveals the upper tracts to be normal   Cystoscopy does not reveal any bladder tumor  The prostate is large and friable and is likely source of microscopic hematuria  We will continue to follow  BPH with obstruction/lower urinary tract symptoms    AUA symptom score is 13 on finasteride  He is satisfied with his voiding pattern  We will continue to follow on present therapy  He will return in 1 year  Diagnoses and all orders for this visit:    BPH with obstruction/lower urinary tract symptoms  -     POCT urine dip auto non-scope    Asymptomatic microscopic hematuria  -     POCT urine dip auto non-scope  -     Cystoscopy          Subjective:      Patient ID: Terrance Jacobsen is a 79 y o  male  Benign Prostatic Hypertrophy  This is a chronic problem  The problem is unchanged  Irritative symptoms do not include frequency, nocturia (NOcturia x 1) or urgency  Obstructive symptoms include dribbling, an intermittent stream, a slower stream and a weak stream  Obstructive symptoms do not include incomplete emptying or straining  Associated symptoms include hematuria  Pertinent negatives include no chills, dysuria, genital pain, hesitancy or vomiting  AUA score is 8-19  His sexual activity is non-contributory to the current illness  Nothing aggravates the symptoms  Past treatments include finasteride  The treatment provided moderate relief  He has been using treatment for 2 or more years  He returns for evaluation of Microhematuria  He remains satisfied with his voiding pattern  The following portions of the patient's history were reviewed and updated as appropriate: allergies, current medications, past family history, past medical history, past social history, past surgical history and problem list     Review of Systems   Constitutional: Negative for chills, diaphoresis, fatigue and fever  HENT: Negative  Eyes: Negative  Respiratory: Negative  Cardiovascular: Negative  Gastrointestinal: Negative  Negative for vomiting  Endocrine: Negative  Genitourinary: Positive for hematuria  Negative for dysuria, frequency, hesitancy, incomplete emptying, nocturia (NOcturia x 1) and urgency  See HPI   Musculoskeletal: Negative  Skin: Negative  Allergic/Immunologic: Negative  Neurological: Negative  Hematological: Negative  Psychiatric/Behavioral: Negative  AUA SYMPTOM SCORE      Most Recent Value   AUA SYMPTOM SCORE   How often have you had a sensation of not emptying your bladder completely after you finished urinating? 1   How often have you had to urinate again less than two hours after you finished urinating? 2   How often have you found you stopped and started again several times when you urinate? 4   How often have you found it difficult to postpone urination? 0   How often have you had a weak urinary stream?  5   How often have you had to push or strain to begin urination? 0   How many times did you most typically get up to urinate from the time you went to bed at night until the time you got up in the morning? 1   Quality of Life: If you were to spend the rest of your life with your urinary condition just the way it is now, how would you feel about that?  2   AUA SYMPTOM SCORE  13        Objective:      /64 (BP Location: Left arm, Patient Position: Sitting, Cuff Size: Adult)   Ht 5' 6" (1 676 m)   Wt 92 5 kg (204 lb)   BMI 32 93 kg/m²          Physical Exam  Vitals signs reviewed  Constitutional:       General: He is not in acute distress  Appearance: Normal appearance  He is well-developed and normal weight  He is not ill-appearing, toxic-appearing or diaphoretic  HENT:      Head: Normocephalic and atraumatic  Eyes:      General: No scleral icterus  Conjunctiva/sclera: Conjunctivae normal    Neck:      Musculoskeletal: Neck supple     Cardiovascular:      Rate and Rhythm: Normal rate  Pulmonary:      Effort: Pulmonary effort is normal    Abdominal:      General: There is no distension  Palpations: There is no mass  Tenderness: There is no abdominal tenderness  There is no right CVA tenderness, left CVA tenderness, guarding or rebound  Hernia: No hernia is present  Comments: No hernia   Genitourinary:     Penis: Normal        Comments: Testes descended and normal to palpation  Skin:     General: Skin is warm and dry  Neurological:      Mental Status: He is alert and oriented to person, place, and time  Psychiatric:         Behavior: Behavior normal          Thought Content: Thought content normal          Judgment: Judgment normal             Cystoscopy     Date/Time 1/18/2021 10:50 AM     Performed by  Delvin Tubbs MD     Authorized by Delvin Tubbs MD      Universal Protocol:  Consent: Verbal consent obtained  Written consent obtained  Risks and benefits: risks, benefits and alternatives were discussed  Consent given by: patient  Patient understanding: patient states understanding of the procedure being performed  Required items: required blood products, implants, devices, and special equipment available  Patient identity confirmed: verbally with patient        Procedure Details:  Procedure type: cystoscopy    Patient tolerance: Patient tolerated the procedure well with no immediate complications    Additional Procedure Details:      Patient presents for cystoscopy  I have discussed the reasons for doing the test, and the potential risks and complications  Patient expressed understanding, and signed informed consent document  The patient was carefully  positioned supine on the examining table  Sterile preparation was performed on the urethra  Xylocaine jelly was instilled and left  Indwelling for the procedure    The 15 Polish flexible cystoscope was passed with the following findings:      Urethra: normal without stricture    Prostate:  lateral lobes - bilobar obstruction                  median lobe -moderate   Prostate size estimated at 80Gm  Prostate is friable,   Vascular  and bleeds easily with cystoscopy  Bladder: moderate trabeculation, occasional small diverticula, no lesions, tumor, or stones  Orifices normal     Residual urine:- minimal    Patient tolerated the procedure well and was escorted from the examining table

## 2021-01-25 ENCOUNTER — TELEPHONE (OUTPATIENT)
Dept: UROLOGY | Facility: CLINIC | Age: 81
End: 2021-01-25

## 2021-01-25 NOTE — TELEPHONE ENCOUNTER
Patient called in to change apt for next year  He only wants to see Dr Naila Kaufman, apt changed and apt card mailed to patient

## 2021-09-22 DIAGNOSIS — N13.8 BPH WITH OBSTRUCTION/LOWER URINARY TRACT SYMPTOMS: ICD-10-CM

## 2021-09-22 DIAGNOSIS — N40.1 BPH WITH OBSTRUCTION/LOWER URINARY TRACT SYMPTOMS: ICD-10-CM

## 2021-09-23 RX ORDER — FINASTERIDE 5 MG/1
TABLET, FILM COATED ORAL
Qty: 90 TABLET | Refills: 3 | Status: SHIPPED | OUTPATIENT
Start: 2021-09-23

## 2021-12-14 NOTE — TELEPHONE ENCOUNTER
Once again patient called in, he will not see AP - only wants to see Dr Diana Parklawn despite urology model

## 2022-02-25 ENCOUNTER — OFFICE VISIT (OUTPATIENT)
Dept: UROLOGY | Facility: MEDICAL CENTER | Age: 82
End: 2022-02-25
Payer: MEDICARE

## 2022-02-25 ENCOUNTER — TELEPHONE (OUTPATIENT)
Dept: UROLOGY | Facility: MEDICAL CENTER | Age: 82
End: 2022-02-25

## 2022-02-25 VITALS
DIASTOLIC BLOOD PRESSURE: 68 MMHG | BODY MASS INDEX: 33.43 KG/M2 | WEIGHT: 208 LBS | HEIGHT: 66 IN | SYSTOLIC BLOOD PRESSURE: 130 MMHG | HEART RATE: 85 BPM

## 2022-02-25 DIAGNOSIS — N40.1 BPH WITH OBSTRUCTION/LOWER URINARY TRACT SYMPTOMS: Primary | ICD-10-CM

## 2022-02-25 DIAGNOSIS — N13.8 BPH WITH OBSTRUCTION/LOWER URINARY TRACT SYMPTOMS: Primary | ICD-10-CM

## 2022-02-25 DIAGNOSIS — R31.21 ASYMPTOMATIC MICROSCOPIC HEMATURIA: ICD-10-CM

## 2022-02-25 PROCEDURE — 99214 OFFICE O/P EST MOD 30 MIN: CPT | Performed by: UROLOGY

## 2022-02-25 NOTE — TELEPHONE ENCOUNTER
Patient of Dr Brett Reeder in [facility]    Patient called stating he is way to the appointment for 10 am today

## 2022-02-25 NOTE — ASSESSMENT & PLAN NOTE
AUA symptom score is 15 on finasteride  He is presently satisfied with his voiding pattern  PSA last year was 0 82 (July 2021)  We will plan to continue the patient on finasteride  He will return in 1 year  Pros and cons were discussed and we will defer further PSA testing at this time

## 2022-02-25 NOTE — PATIENT INSTRUCTIONS
Enlarged Prostate (BPH)   WHAT YOU NEED TO KNOW:   An enlarged prostate (BPH) is a common condition in older adults  BPH develops because the number of prostate cells increases (hyperplasia) or the cells get bigger (hypertrophy)  The prostate wraps around the urethra  An enlarged prostate can press on the urethra  This may cause problems with storing urine or emptying your bladder completely  DISCHARGE INSTRUCTIONS:   Call your doctor or urologist if:   · You see blood in your urine  · You are not able to urinate  · Your bladder feels very full and painful  · You have new or worsening symptoms  · You have a fever  · You have questions or concerns about your condition or care  Medicines:   · Medicines  may be used to relax the muscles in your prostate and bladder  This may help you urinate more easily  You may also need medicine that helps shrink, or slow the growth of your prostate  · Take your medicine as directed  Contact your healthcare provider if you think your medicine is not helping or if you have side effects  Tell him or her if you are allergic to any medicine  Keep a list of the medicines, vitamins, and herbs you take  Include the amounts, and when and why you take them  Bring the list or the pill bottles to follow-up visits  Carry your medicine list with you in case of an emergency  What you can do to manage your symptoms:   · Urinate on a regular schedule  This will train your bladder to hold urine longer  A larger amount of urine may make it easier to urinate  · Drink less liquid during the day  Do not have liquid for several hours before you go to bed at night  Do not drink large amounts of any liquid at one time  · Limit alcohol and caffeine  These can irritate your bladder and make your symptoms worse  · Eat less salt  Salt can cause fluid buildup and make it harder to urinate  Examples of salty foods are chips, cured meats, and canned soups   Do not use table salt  · Elevate your legs if you have swelling  Elevate (raise) your legs above the level of your heart  This can relieve swelling caused by fluid buildup  You may not have to get up in the night to urinate  · Exercise regularly  Exercise can help improve your symptoms  Ask your healthcare provider what a healthy weight for you is  Aim to get at least 30 minutes of exercise on most days of the week  Follow up with your doctor or urologist as directed:  Write down your questions so you remember to ask them during your visits  © Copyright [employer] 2022 Information is for End User's use only and may not be sold, redistributed or otherwise used for commercial purposes  All illustrations and images included in CareNotes® are the copyrighted property of [employer]  or Marianela Salinas  The above information is an  only  It is not intended as medical advice for individual conditions or treatments  Talk to your doctor, nurse or pharmacist before following any medical regimen to see if it is safe and effective for you

## 2022-02-25 NOTE — ASSESSMENT & PLAN NOTE
Negative workup last year including ultrasound and cystoscopy  Most recent urinalysis with microscopic done on January 27th 2022  did not reveal significant microscopic hematuria  We will continue to follow

## 2022-02-25 NOTE — PROGRESS NOTES
Assessment/Plan:    BPH with obstruction/lower urinary tract symptoms  AUA symptom score is 15 on finasteride  He is presently satisfied with his voiding pattern  PSA last year was 0 82 (July 2021)  We will plan to continue the patient on finasteride  He will return in 1 year  Pros and cons were discussed and we will defer further PSA testing at this time  Asymptomatic microscopic hematuria  Negative workup last year including ultrasound and cystoscopy  Most recent urinalysis with microscopic done on January 27th 2022  did not reveal significant microscopic hematuria  We will continue to follow  Diagnoses and all orders for this visit:    BPH with obstruction/lower urinary tract symptoms    Asymptomatic microscopic hematuria          Subjective:      Patient ID: Merlinda Senna is a 79 y o  male  Benign Prostatic Hypertrophy  This is a chronic problem  The current episode started more than 1 year ago  The problem is unchanged  Irritative symptoms do not include frequency or urgency  Nocturia: Nocturia x 1  Obstructive symptoms include dribbling and a slower stream  Obstructive symptoms do not include incomplete emptying, an intermittent stream, straining or a weak stream  Pertinent negatives include no chills, dysuria, genital pain, hematuria, hesitancy, nausea or vomiting  AUA score is 8-19  His sexual activity is non-contributory to the current illness  Nothing aggravates the symptoms  Past treatments include finasteride  The treatment provided moderate relief  He has been using treatment for 2 or more years  The following portions of the patient's history were reviewed and updated as appropriate: allergies, current medications, past family history, past medical history, past social history, past surgical history and problem list     Review of Systems   Constitutional: Negative  Negative for chills, diaphoresis, fatigue and fever  HENT: Negative  Eyes: Negative      Respiratory: Positive for shortness of breath (with exertion)  Cardiovascular: Negative  Gastrointestinal: Negative  Negative for nausea and vomiting  Endocrine: Negative  Genitourinary: Negative for dysuria, frequency, hematuria, hesitancy, incomplete emptying and urgency  Nocturia: Nocturia x 1  See HPI   Musculoskeletal: Positive for arthralgias  Skin: Negative  Allergic/Immunologic: Negative  Neurological: Negative  Hematological: Negative  Psychiatric/Behavioral: Negative  AUA SYMPTOM SCORE      Most Recent Value   AUA SYMPTOM SCORE    How often have you had a sensation of not emptying your bladder completely after you finished urinating? 2   How often have you had to urinate again less than two hours after you finished urinating? 3   How often have you found you stopped and started again several times when you urinate? 3   How often have you found it difficult to postpone urination? 1   How often have you had a weak urinary stream? 5   How often have you had to push or strain to begin urination? 0   How many times did you most typically get up to urinate from the time you went to bed at night until the time you got up in the morning? 1   Quality of Life: If you were to spend the rest of your life with your urinary condition just the way it is now, how would you feel about that? 2   AUA SYMPTOM SCORE 15            Objective:      /68   Pulse 85   Ht 5' 6" (1 676 m)   Wt 94 3 kg (208 lb)   BMI 33 57 kg/m²          Physical Exam  Vitals reviewed  Constitutional:       General: He is not in acute distress  Appearance: Normal appearance  He is well-developed  He is obese  He is not ill-appearing, toxic-appearing or diaphoretic  HENT:      Head: Normocephalic and atraumatic  Eyes:      Conjunctiva/sclera: Conjunctivae normal    Cardiovascular:      Rate and Rhythm: Normal rate     Pulmonary:      Effort: Pulmonary effort is normal    Abdominal:      General: Bowel sounds are normal  There is no distension  Palpations: Abdomen is soft  There is no mass  Tenderness: There is no abdominal tenderness  There is no right CVA tenderness, left CVA tenderness, guarding or rebound  Hernia: No hernia is present  There is no hernia in the left inguinal area  Genitourinary:     Penis: Normal and uncircumcised  No phimosis, paraphimosis, hypospadias, erythema, tenderness or discharge  Testes: Normal          Right: Mass not present  Left: Mass not present  Rectum: Normal       Comments: Prostate 2 5 X enlarged and palpably benign  No nodule or induration is noted  Prostate is symmetric  Musculoskeletal:      Cervical back: Neck supple  Skin:     General: Skin is warm and dry  Neurological:      General: No focal deficit present  Mental Status: He is alert and oriented to person, place, and time  Psychiatric:         Mood and Affect: Mood normal          Behavior: Behavior normal          Thought Content:  Thought content normal          Judgment: Judgment normal

## 2022-10-05 DIAGNOSIS — N40.1 BPH WITH OBSTRUCTION/LOWER URINARY TRACT SYMPTOMS: ICD-10-CM

## 2022-10-05 DIAGNOSIS — N13.8 BPH WITH OBSTRUCTION/LOWER URINARY TRACT SYMPTOMS: ICD-10-CM

## 2022-10-06 RX ORDER — FINASTERIDE 5 MG/1
TABLET, FILM COATED ORAL
Qty: 90 TABLET | Refills: 3 | Status: SHIPPED | OUTPATIENT
Start: 2022-10-06

## 2023-02-01 DIAGNOSIS — N40.1 BPH WITH OBSTRUCTION/LOWER URINARY TRACT SYMPTOMS: ICD-10-CM

## 2023-02-01 DIAGNOSIS — N13.8 BPH WITH OBSTRUCTION/LOWER URINARY TRACT SYMPTOMS: ICD-10-CM

## 2023-02-01 RX ORDER — FINASTERIDE 5 MG/1
5 TABLET, FILM COATED ORAL DAILY
Qty: 90 TABLET | Refills: 3 | Status: SHIPPED | OUTPATIENT
Start: 2023-02-01

## 2023-02-01 NOTE — TELEPHONE ENCOUNTER
Medication Refill Request     Name finasteride (PROSCAR) 5 mg tablet  Dose/Frequency 1 TABLET DAILY  Quantity 90  Verified pharmacy   [x]  Verified ordering Provider   [x]  Does patient have enough for the next 3 days?  Yes [x] No []    Please send refill to pharmacy plan below"  [facility]  ID #[id number]  Ph: [phone]  Fx: [phone]    Patient wants a 1 year refill supply

## 2023-02-23 ENCOUNTER — TELEPHONE (OUTPATIENT)
Dept: UROLOGY | Facility: MEDICAL CENTER | Age: 83
End: 2023-02-23

## 2023-02-23 DIAGNOSIS — N13.8 BPH WITH OBSTRUCTION/LOWER URINARY TRACT SYMPTOMS: ICD-10-CM

## 2023-02-23 DIAGNOSIS — N40.1 BPH WITH OBSTRUCTION/LOWER URINARY TRACT SYMPTOMS: ICD-10-CM

## 2023-02-23 RX ORDER — FINASTERIDE 5 MG/1
5 TABLET, FILM COATED ORAL DAILY
Qty: 90 TABLET | Refills: 3 | Status: SHIPPED | OUTPATIENT
Start: 2023-02-23

## 2023-02-23 RX ORDER — SIMVASTATIN 10 MG
TABLET ORAL
COMMUNITY
Start: 2023-02-01

## 2023-02-23 NOTE — TELEPHONE ENCOUNTER
Pt was scheduled to see CHRISTOPHENP today, but due to emergency in hospital CRNP is not in the office  Pt rescheduled his appointment but needs a refill of   finasteride (PROSCAR) 5 mg tablet [[id number]]     Order Details  Dose: 5 mg Route: Oral Frequency: Daily   Dispense Quantity: 90 tablet Refills: 3          Sig: Take 1 tablet (5 mg total) by mouth daily         Start Date: 02/01/23 End Date: --   Written Date: 02/01/23 Expiration Date: 02/01/24       Diagnosis Association: BPH with obstruction/lower urinary tract symptoms (N40 1 , N13 8)     Pt would like this refill sent to his [address] listed in his chart

## 2023-03-07 DIAGNOSIS — N40.1 BPH WITH OBSTRUCTION/LOWER URINARY TRACT SYMPTOMS: ICD-10-CM

## 2023-03-07 DIAGNOSIS — N13.8 BPH WITH OBSTRUCTION/LOWER URINARY TRACT SYMPTOMS: ICD-10-CM

## 2023-03-07 RX ORDER — FINASTERIDE 5 MG/1
5 TABLET, FILM COATED ORAL DAILY
Qty: 90 TABLET | Refills: 3 | Status: SHIPPED | OUTPATIENT
Start: 2023-03-07

## 2023-03-07 NOTE — TELEPHONE ENCOUNTER
Medication Refill Request     Name  finasteride (PROSCAR) 5 mg tablet  Dose/Frequency Take 1 tablet (5 mg total) by mouth daily  Quantity 90 tablet  Verified pharmacy   [x]  Verified ordering Provider   [x]  Does patient have enough for the next 3 days? Yes [x] No []    Per patient he [facility]

## 2024-01-30 DIAGNOSIS — N13.8 BPH WITH OBSTRUCTION/LOWER URINARY TRACT SYMPTOMS: ICD-10-CM

## 2024-01-30 DIAGNOSIS — N40.1 BPH WITH OBSTRUCTION/LOWER URINARY TRACT SYMPTOMS: ICD-10-CM

## 2024-01-30 RX ORDER — FINASTERIDE 5 MG/1
5 TABLET, FILM COATED ORAL DAILY
Qty: 90 TABLET | Refills: 1 | Status: SHIPPED | OUTPATIENT
Start: 2024-01-30

## 2024-07-15 DIAGNOSIS — N13.8 BPH WITH OBSTRUCTION/LOWER URINARY TRACT SYMPTOMS: ICD-10-CM

## 2024-07-15 DIAGNOSIS — N40.1 BPH WITH OBSTRUCTION/LOWER URINARY TRACT SYMPTOMS: ICD-10-CM

## 2024-07-15 RX ORDER — FINASTERIDE 5 MG/1
5 TABLET, FILM COATED ORAL DAILY
Qty: 90 TABLET | Refills: 1 | Status: SHIPPED | OUTPATIENT
Start: 2024-07-15

## 2024-07-15 NOTE — TELEPHONE ENCOUNTER
Call patient to schedule annual follow-up appointment.  He was last seen in 4/2023 by Dr. Rea.  Prescription refilled and sent.

## 2024-07-15 NOTE — TELEPHONE ENCOUNTER
Reason for call:   [x] Refill   [] Prior Auth  [] Other:     Office:   [] PCP/Provider -   [x] Specialty/Provider - urology, Dr Rea,     Medication:   Finasteride 5 mg, 1 qd, 90      [facility]    Does the patient have enough for 3 days?   [x] Yes   [] No - Send as HP to POD

## 2024-08-05 ENCOUNTER — TELEPHONE (OUTPATIENT)
Dept: UROLOGY | Facility: MEDICAL CENTER | Age: 84
End: 2024-08-05

## 2024-08-05 DIAGNOSIS — R30.0 DYSURIA: ICD-10-CM

## 2024-08-05 DIAGNOSIS — N13.8 BPH WITH OBSTRUCTION/LOWER URINARY TRACT SYMPTOMS: Primary | ICD-10-CM

## 2024-08-05 DIAGNOSIS — N40.1 BPH WITH OBSTRUCTION/LOWER URINARY TRACT SYMPTOMS: Primary | ICD-10-CM

## 2024-08-05 RX ORDER — PHENAZOPYRIDINE HYDROCHLORIDE 200 MG/1
200 TABLET, FILM COATED ORAL
Qty: 10 TABLET | Refills: 0 | Status: SHIPPED | OUTPATIENT
Start: 2024-08-05

## 2024-08-05 RX ORDER — OXYBUTYNIN CHLORIDE 5 MG/1
5 TABLET ORAL 3 TIMES DAILY
Qty: 15 TABLET | Refills: 0 | Status: SHIPPED | OUTPATIENT
Start: 2024-08-05 | End: 2024-08-10

## 2024-08-05 NOTE — TELEPHONE ENCOUNTER
Pt presented to office today asking for whitlock removal.  He had whitlock placed in  ER yesterday for retention.  Explained that the whitlock will stay in place for at least 7 days.  He is scheduled on 8/13/24 with nurse in [place]. Pt last seen by Dr. Rea on 4/21/23 for BPH.    Pt states he is having leakage from penis, burning and some bleeding.  Advised that this is normal with whitlock placement.  Will forward to provider for orders and possible RX for oxybutinin and Pyridium.

## 2024-08-05 NOTE — TELEPHONE ENCOUNTER
Please call the patient and advise him that I sent over some Pyridium for his irritative voiding.

## 2024-08-05 NOTE — TELEPHONE ENCOUNTER
No LV ER visit seen on chart review or in care everywhere.     Void trial orders:   Remove whitlock catheter at 8 am   Wait 6 hours   Have patient return for bladder scan   If bladder scan <300- no catheter need   If bladder scan >300-replace indwelling catheter and repeat in 1 week     I placed oxybutynin for the patient- must stop 3 days prior to whitlock removal. Sent to [facility] in [place].

## 2024-08-13 ENCOUNTER — PROCEDURE VISIT (OUTPATIENT)
Dept: UROLOGY | Facility: MEDICAL CENTER | Age: 84
End: 2024-08-13
Payer: MEDICARE

## 2024-08-13 VITALS
DIASTOLIC BLOOD PRESSURE: 68 MMHG | WEIGHT: 200 LBS | BODY MASS INDEX: 33.32 KG/M2 | SYSTOLIC BLOOD PRESSURE: 128 MMHG | HEIGHT: 65 IN

## 2024-08-13 DIAGNOSIS — N13.8 BPH WITH OBSTRUCTION/LOWER URINARY TRACT SYMPTOMS: Primary | ICD-10-CM

## 2024-08-13 DIAGNOSIS — N40.1 BPH WITH OBSTRUCTION/LOWER URINARY TRACT SYMPTOMS: Primary | ICD-10-CM

## 2024-08-13 LAB — POST-VOID RESIDUAL VOLUME, ML POC: 12 ML

## 2024-08-13 PROCEDURE — 99211 OFF/OP EST MAY X REQ PHY/QHP: CPT

## 2024-08-13 PROCEDURE — 51798 US URINE CAPACITY MEASURE: CPT

## 2024-08-13 NOTE — PROGRESS NOTES
"8/13/2024    Renan Gonzales  1940  [id number]    Diagnosis  Chief Complaint    Benign Prostatic Hypertrophy; Urinary Retention         Patient presents for whitlock catheter removal s/p acute urinary retention and whitlock catheter placement at [facility] ER  managed by Dr. Rea    Plan  Return to the office this afternoon for PVR     Procedure Whitlock removal/voiding trial    Whitlock catheter removed after deflation of an intact balloon. Patient tolerated well. Encouraged patient to hydrate well and return this afternoon for post void residual.  he knows he may return early if uncomfortable and unable to urinate. Patient agrees to this plan.    Patient returned this afternoon. Patient states able to void. Patient voided while in office. Bladder ultrasound performed and PVR measured 12ml.  Encouraged pt to continue to hydrate well and to monitor his symptoms. ER precautions were reviewed with the pt if symptoms worsen or he is unable to urinate. Pt verbalized his understanding and is scheduled for yearly FU in 4 weeks with the MD.   He is aware he can contact the office prior if needed.         Vitals:    08/13/24 0906   BP: 128/68   BP Location: Left arm   Patient Position: Sitting   Cuff Size: Standard   Weight: 90.7 kg (200 lb)   Height: 5' 5\" (1.651 m)           Maggy Ordaz RN       "

## 2024-08-28 ENCOUNTER — TELEPHONE (OUTPATIENT)
Dept: UROLOGY | Facility: MEDICAL CENTER | Age: 84
End: 2024-08-28

## 2024-08-28 DIAGNOSIS — N13.8 ENLARGED PROSTATE WITH URINARY OBSTRUCTION: Primary | ICD-10-CM

## 2024-08-28 DIAGNOSIS — N40.1 ENLARGED PROSTATE WITH URINARY OBSTRUCTION: Primary | ICD-10-CM

## 2024-08-28 NOTE — TELEPHONE ENCOUNTER
Spoke to patient requesting he get his PSA done prior to 9/10 appt with Dr. Rea.  I informed the patient it has been over a year since his last PSA.  Patient uses [facility] and I told him they can see the order in his MyChart.  Patient understood.

## 2024-09-06 LAB — PSA SERPL-MCNC: 2.37 NG/ML

## 2024-09-06 NOTE — TELEPHONE ENCOUNTER
[facility] lab called requesting order for PSA as patient is currently at the lab. Faxed PSA order to [phone].

## 2024-09-10 ENCOUNTER — OFFICE VISIT (OUTPATIENT)
Dept: UROLOGY | Facility: MEDICAL CENTER | Age: 84
End: 2024-09-10
Payer: MEDICARE

## 2024-09-10 VITALS
SYSTOLIC BLOOD PRESSURE: 140 MMHG | HEART RATE: 74 BPM | HEIGHT: 65 IN | OXYGEN SATURATION: 96 % | DIASTOLIC BLOOD PRESSURE: 80 MMHG | BODY MASS INDEX: 33.62 KG/M2 | WEIGHT: 201.8 LBS

## 2024-09-10 DIAGNOSIS — N13.8 BPH WITH OBSTRUCTION/LOWER URINARY TRACT SYMPTOMS: Primary | ICD-10-CM

## 2024-09-10 DIAGNOSIS — N40.1 BPH WITH OBSTRUCTION/LOWER URINARY TRACT SYMPTOMS: Primary | ICD-10-CM

## 2024-09-10 DIAGNOSIS — R82.81 PYURIA: ICD-10-CM

## 2024-09-10 LAB
POST-VOID RESIDUAL VOLUME, ML POC: 13 ML
SL AMB  POCT GLUCOSE, UA: NORMAL
SL AMB LEUKOCYTE ESTERASE,UA: NORMAL
SL AMB POCT BILIRUBIN,UA: NORMAL
SL AMB POCT BLOOD,UA: NORMAL
SL AMB POCT CLARITY,UA: NORMAL
SL AMB POCT COLOR,UA: NORMAL
SL AMB POCT KETONES,UA: NORMAL
SL AMB POCT NITRITE,UA: NORMAL
SL AMB POCT PH,UA: 7
SL AMB POCT SPECIFIC GRAVITY,UA: 1.02
SL AMB POCT URINE PROTEIN: NORMAL
SL AMB POCT UROBILINOGEN: 0.2

## 2024-09-10 PROCEDURE — 99214 OFFICE O/P EST MOD 30 MIN: CPT | Performed by: UROLOGY

## 2024-09-10 PROCEDURE — 87077 CULTURE AEROBIC IDENTIFY: CPT | Performed by: UROLOGY

## 2024-09-10 PROCEDURE — 81003 URINALYSIS AUTO W/O SCOPE: CPT | Performed by: UROLOGY

## 2024-09-10 PROCEDURE — 87086 URINE CULTURE/COLONY COUNT: CPT | Performed by: UROLOGY

## 2024-09-10 PROCEDURE — 87186 SC STD MICRODIL/AGAR DIL: CPT | Performed by: UROLOGY

## 2024-09-10 PROCEDURE — 51798 US URINE CAPACITY MEASURE: CPT | Performed by: UROLOGY

## 2024-09-10 RX ORDER — TAMSULOSIN HYDROCHLORIDE 0.4 MG/1
0.4 CAPSULE ORAL
Qty: 90 CAPSULE | Refills: 3 | Status: SHIPPED | OUTPATIENT
Start: 2024-09-10

## 2024-09-10 RX ORDER — FINASTERIDE 5 MG/1
5 TABLET, FILM COATED ORAL DAILY
Qty: 90 TABLET | Refills: 3 | Status: SHIPPED | OUTPATIENT
Start: 2024-09-10

## 2024-09-10 RX ORDER — BECLOMETHASONE DIPROPIONATE HFA 40 UG/1
AEROSOL, METERED RESPIRATORY (INHALATION)
COMMUNITY
Start: 2024-07-15

## 2024-09-10 NOTE — PROGRESS NOTES
Ambulatory Visit  Name: Renan Gonzales      : 1940      MRN: [id number]  Encounter Provider: Brandyn Rea MD  Encounter Date: 9/10/2024   Encounter department: [facility]OLOG[facility] [place]    Assessment & Plan  BPH with obstruction/lower urinary tract symptoms  AUA symptom score is 15 on finasteride.  He has a mixed feeling about his voiding pattern.  PSA is normal at 2.37.  This is a little high if finasteride use is accounted for.  Postvoid residual satisfactory.  In light of his recent retention I did recommend a trial of tamsulosin.  I also he recommended he return for cystoscopy and transrectal ultrasound the prostate to measure prostate size.  He may be interested in a procedure to open the bladder outlet and prevent recurrence of his urinary retention.  Greenlight laser information was provided.  Orders:    POCT urine dip auto non-scope    POCT Measure PVR    tamsulosin (FLOMAX) 0.4 mg; Take 1 capsule (0.4 mg total) by mouth daily with dinner    finasteride (PROSCAR) 5 mg tablet; Take 1 tablet (5 mg total) by mouth daily    Cystoscopy; Future    Pyuria  Pyuria is noted.  We will check a urine culture.  He is asymptomatic.  Will plan antibiotic therapy prior to cystoscopy if his culture is positive     Orders:    Urine culture      History of Present Illness     Renan Gonzales is a 83 y.o. male who presents for follow up- he had an episode of retention in August with 800cc noted on insertion of whitlock. Whitlock removed after 10 days and he is now voiding well.  No fever or chills.  No dysuria or gross hematuria.  He gets up once a night to urinate.  He feels he does not empty his bladder well in the morning.    History obtained from : patient  Review of Systems   Constitutional:  Negative for chills, diaphoresis, fatigue and fever.   HENT: Negative.     Eyes: Negative.    Respiratory: Negative.     Cardiovascular: Negative.    Gastrointestinal: Negative.    Endocrine: Negative.   "  Genitourinary:         See HPI   Musculoskeletal: Negative.    Skin: Negative.    Allergic/Immunologic: Negative.    Neurological: Negative.    Hematological: Negative.    Psychiatric/Behavioral: Negative.             AUA SYMPTOM SCORE      Flowsheet Row Most Recent Value   AUA SYMPTOM SCORE    How often have you had a sensation of not emptying your bladder completely after you finished urinating? 3   How often have you had to urinate again less than two hours after you finished urinating? 3   How often have you found you stopped and started again several times when you urinate? 4   How often have you found it difficult to postpone urination? 1   How often have you had a weak urinary stream? 0   How often have you had to push or strain to begin urination? 3   How many times did you most typically get up to urinate from the time you went to bed at night until the time you got up in the morning? 1   Quality of Life: If you were to spend the rest of your life with your urinary condition just the way it is now, how would you feel about that? 3   AUA SYMPTOM SCORE 15          Objective     /80 (BP Location: Left arm, Patient Position: Sitting, Cuff Size: Adult)   Pulse 74   Ht 5' 5\" (1.651 m)   Wt 91.5 kg (201 lb 12.8 oz)   SpO2 96%   BMI 33.58 kg/m²   Physical Exam  Vitals reviewed.   Constitutional:       General: He is not in acute distress.     Appearance: Normal appearance. He is well-developed. He is obese. He is not ill-appearing, toxic-appearing or diaphoretic.   HENT:      Head: Normocephalic and atraumatic.   Eyes:      General: No scleral icterus.     Conjunctiva/sclera: Conjunctivae normal.   Cardiovascular:      Rate and Rhythm: Normal rate.   Pulmonary:      Effort: Pulmonary effort is normal.   Abdominal:      General: Bowel sounds are normal. There is no distension.      Palpations: Abdomen is soft. There is no mass.      Tenderness: There is no abdominal tenderness. There is no right CVA " "tenderness, left CVA tenderness, guarding or rebound.      Hernia: No hernia is present.   Genitourinary:     Penis: Normal. No phimosis or hypospadias.       Testes: Normal.         Right: Mass not present.         Left: Mass not present.      Rectum: Normal.      Comments: Prostate 2.5X enlarged and palpably benign.  Musculoskeletal:         General: Normal range of motion.      Cervical back: Neck supple.   Skin:     General: Skin is warm and dry.   Neurological:      General: No focal deficit present.      Mental Status: He is alert and oriented to person, place, and time.   Psychiatric:         Mood and Affect: Mood normal.         Behavior: Behavior normal.         Thought Content: Thought content normal.         Judgment: Judgment normal.       Results  Lab Results   Component Value Date    PSA 2.37 09/06/2024    PSA 0.8 11/18/2020     Lab Results   Component Value Date    CALCIUM 9.1 09/06/2024    K 4.1 09/06/2024    CO2 28 09/06/2024     09/06/2024    BUN 18 09/06/2024    CREATININE 0.89 09/06/2024     No results found for: \"WBC\", \"HGB\", \"HCT\", \"MCV\", \"PLT\"    Office Urine Dip  Recent Results (from the past 1 hour(s))   POCT urine dip auto non-scope    Collection Time: 09/10/24  3:15 PM   Result Value Ref Range     COLOR,UA straw     CLARITY,UA cloudy     SPECIFIC GRAVITY,UA 1.020      PH,UA 7.0     LEUKOCYTE ESTERASE,UA mod     NITRITE,UA pos     GLUCOSE, UA n     KETONES,UA n     BILIRUBIN,UA n     BLOOD,UA tr     POCT URINE PROTEIN tr     SL AMB POCT UROBILINOGEN 0.2    POCT Measure PVR    Collection Time: 09/10/24  3:15 PM   Result Value Ref Range    POST-VOID RESIDUAL VOLUME, ML POC 13 mL   ]    Administrative Statements         "

## 2024-09-10 NOTE — PATIENT INSTRUCTIONS
"  Patient Education     Benign prostatic hyperplasia (enlarged prostate)   The Basics   Written by the doctors and editors at [facility]   What is benign prostatic hyperplasia? -- \"Benign prostatic hyperplasia\" is the medical term for an enlarged prostate. The prostate is a gland that surrounds the urethra (the tube that carries urine from the bladder out through the penis) (figure 1). This gland often gets bigger as a person gets older.  Benign prostatic hyperplasia, also called \"BPH,\" is a common problem. It has nothing to do with prostate cancer. In fact, the word \"benign\" means \"not cancer.\"  What are the symptoms of BPH? -- Many people with BPH have no symptoms at all. When symptoms do occur, they can include:   Needing to urinate often, especially at night   Having trouble starting to urinate (this means that you might have to wait or strain before urine will come out)   Having a weak urine stream   Leaking or dribbling urine   Feeling as though your bladder is not empty even after you urinate  In rare cases, BPH can make it so you cannot urinate at all. This is a serious problem. If you cannot urinate at all, call your doctor right away.  Is there a test for BPH? -- Yes. Your doctor can check for BPH by doing a rectal exam. That means that they will put a finger into your anus to check how big your prostate is and what it feels like (figure 2). Your doctor might also do urine or blood tests to see if your symptoms might be caused by another problem, such as a bladder infection.  If you have symptoms like the ones listed above, see your doctor or nurse. They can figure out if BPH is really what's causing them. Those symptoms can also be caused by other problems, so it's important to have them checked out.  Is there anything I can do on my own to feel better? -- Yes. You might be able to improve your BPH symptoms if you:   Drink less fluids, especially just before bed or going out.   Drink less alcohol and " "caffeine. These drinks can make you urinate more often.   Avoid cold and allergy medicines that contain antihistamines or decongestants. These medicines can make the symptoms of BPH worse.   Do \"double voiding.\" That means that after you empty your bladder, you wait a moment, relax, and try to urinate again. It might also help to try to urinate at certain times, even if you don't feel that you need to.   Urinate when you first feel the urge.  How is BPH treated? -- If you do have BPH, your doctor can talk to you about treatment options. But you don't have to get treated if your symptoms do not bother you. Unless you lose the ability to urinate completely, leaving BPH untreated will not hurt you.  Treatments options include:   Watchful waiting - This means that you wait to see if your symptoms change, but you don't have treatment right away. If you choose watchful waiting, you can decide to try treatment later if your symptoms get worse or if your symptoms start to bother you more.   Medicines - There are 2 types of medicine commonly used to treat BPH. One type relaxes the muscles that surround the urethra. The other type keeps the prostate from growing more or even helps shrink the prostate. In some cases, doctors suggest taking both types of medicine at the same time. Depending on your symptoms, your doctor might also suggest other medicines.   Surgery - There are several ways to treat BPH with surgery. They can involve removing some of the prostate, shrinking the prostate, or making the urethra wider so that more urine can flow through. For most of these procedures, a doctor inserts special tools into the urethra.  How do I choose which treatment to have? -- The right treatment for you will depend on:   How much your symptoms bother you   How you feel about the different treatment options  If your symptoms don't bother you very much, you might not need any treatment. But if your symptoms do bother you, you " probably should get treated.  Doctors often suggest trying medicines first to see if they help. If medicines don't do enough, surgery is also an option. As you think about your choices, remember that treatments can have a downside. For example, medicines can cause side effects. And surgery has some general risks, and can also sometimes cause sexual problems and other side effects.  When you're thinking about which treatment to have, ask your doctor or nurse these questions:   How likely is it that this treatment will improve my symptoms?   What are the risks or side effects of this treatment?   What happens if I don't have this treatment?  When should I call the doctor? -- Call for advice if:   You have pain in your back, shoulder, or belly.   You have a fever of 100.4°F (38°C) or higher, chills, burning, or pain when you urinate.   You are not able to urinate or have more problems urinating.  All topics are updated as new evidence becomes available and our peer review process is complete.  This topic retrieved from [employer] on: Feb 26, 2024.  Topic 04855 Version 21.0  Release: 32.2.4 - C32.56  © 2024 UpToDate, Inc. and/or its affiliates. All rights reserved.  figure 1: Prostate gland     This drawing shows male internal organs and a close-up of the prostate gland.  Graphic 32700 Version 5.0  figure 2: Rectal exam     During a rectal exam, the doctor or nurse puts a finger inside your rectum and feels your prostate gland. That way, they can see how big it is and whether it has bumps or dents or anything unusual.  Graphic 26252 Version 6.0  Consumer Information Use and Disclaimer   Disclaimer: This generalized information is a limited summary of diagnosis, treatment, and/or medication information. It is not meant to be comprehensive and should be used as a tool to help the user understand and/or assess potential diagnostic and treatment options. It does NOT include all information about conditions, treatments,  medications, side effects, or risks that may apply to a specific patient. It is not intended to be medical advice or a substitute for the medical advice, diagnosis, or treatment of a health care provider based on the health care provider's examination and assessment of a patient's specific and unique circumstances. Patients must speak with a health care provider for complete information about their health, medical questions, and treatment options, including any risks or benefits regarding use of medications. This information does not endorse any treatments or medications as safe, effective, or approved for treating a specific patient. UpToDate, Inc. and its affiliates disclaim any warranty or liability relating to this information or the use thereof.The use of this information is governed by the Terms of Use, available at https://www.woltersMorizonuwer.com/en/know/clinical-effectiveness-terms. 2024© UpToDate, Inc. and its affiliates and/or licensors. All rights reserved.  Copyright   © 2024 UpToDate, Inc. and/or its affiliates. All rights reserved.

## 2024-09-10 NOTE — ASSESSMENT & PLAN NOTE
AUA symptom score is 15 on finasteride.  He has a mixed feeling about his voiding pattern.  PSA is normal at 2.37.  This is a little high if finasteride use is accounted for.  Postvoid residual satisfactory.  In light of his recent retention I did recommend a trial of tamsulosin.  I also he recommended he return for cystoscopy and transrectal ultrasound the prostate to measure prostate size.  He may be interested in a procedure to open the bladder outlet and prevent recurrence of his urinary retention.  Greenlight laser information was provided.  Orders:    POCT urine dip auto non-scope    POCT Measure PVR    tamsulosin (FLOMAX) 0.4 mg; Take 1 capsule (0.4 mg total) by mouth daily with dinner    finasteride (PROSCAR) 5 mg tablet; Take 1 tablet (5 mg total) by mouth daily    Cystoscopy; Future

## 2024-09-13 LAB — BACTERIA UR CULT: ABNORMAL

## 2024-09-18 ENCOUNTER — TELEPHONE (OUTPATIENT)
Dept: UROLOGY | Facility: MEDICAL CENTER | Age: 84
End: 2024-09-18

## 2024-09-18 DIAGNOSIS — N30.00 ACUTE CYSTITIS WITHOUT HEMATURIA: Primary | ICD-10-CM

## 2024-09-18 RX ORDER — SULFAMETHOXAZOLE/TRIMETHOPRIM 800-160 MG
1 TABLET ORAL 2 TIMES DAILY
Qty: 14 TABLET | Refills: 0 | Status: SHIPPED | OUTPATIENT
Start: 2024-09-18 | End: 2024-09-25

## 2024-09-18 NOTE — TELEPHONE ENCOUNTER
Spoke to patient to inform him of positive urine culture.  I told the patient an abx was sent to [facility] on [address]. I stated it is one tablet PO BID for 7 days.  I then informed the patient he needs to have another urine culture done 10 days prior to in office cystoscopy to make sure he does not have an infection for the procedure.  Patient understood.    ----- Message from Brandyn Rea MD sent at 9/18/2024 11:11 AM EDT -----  Inform pt of abnormal test result. Urine culture is positive.  I will prescribe Bactrim DS 1 p.o. twice daily x 1 week.  He should have a urine culture done again 10 days prior to his cystoscopy appointment.  Please make follow up appt to discuss results

## 2024-09-18 NOTE — RESULT ENCOUNTER NOTE
Inform pt of abnormal test result. Urine culture is positive.  I will prescribe Bactrim DS 1 p.o. twice daily x 1 week.  He should have a urine culture done again 10 days prior to his cystoscopy appointment.  Please make follow up appt to discuss results

## 2024-11-07 ENCOUNTER — NURSE TRIAGE (OUTPATIENT)
Age: 84
End: 2024-11-07

## 2024-11-07 DIAGNOSIS — R31.9 HEMATURIA, UNSPECIFIED TYPE: Primary | ICD-10-CM

## 2024-11-07 RX ORDER — SULFAMETHOXAZOLE AND TRIMETHOPRIM 800; 160 MG/1; MG/1
1 TABLET ORAL EVERY 12 HOURS SCHEDULED
Qty: 14 TABLET | Refills: 0 | Status: SHIPPED | OUTPATIENT
Start: 2024-11-07 | End: 2024-11-08 | Stop reason: SDUPTHER

## 2024-11-08 LAB
BACTERIA URNS QL MICRO: ABNORMAL
GLUCOSE UR QL STRIP: NEGATIVE MG/DL
HGB UR QL STRIP: ABNORMAL MG/DL
KETONES UR QL STRIP: NEGATIVE MG/DL
LEUKOCYTE ESTERASE UR QL STRIP: 500 /UL
MUCOUS THREADS URNS QL MICRO: ABNORMAL
NITRITE UR QL STRIP: POSITIVE
PH UR: 5 [PH] (ref 4.5–8)
PROT 24H UR-MRATE: ABNORMAL MG/DL
RBC #/AREA URNS HPF: >100 /HPF (ref 0–2)
SL AMB POCT URINE COMMENT: ABNORMAL
SP GR UR: 1.02 (ref 1–1.03)
WBC #/AREA URNS HPF: >100 /HPF (ref 0–5)

## 2024-11-08 RX ORDER — SULFAMETHOXAZOLE AND TRIMETHOPRIM 800; 160 MG/1; MG/1
1 TABLET ORAL EVERY 12 HOURS SCHEDULED
Qty: 14 TABLET | Refills: 0 | Status: SHIPPED | OUTPATIENT
Start: 2024-11-08 | End: 2024-11-15

## 2024-11-09 ENCOUNTER — HOSPITAL ENCOUNTER (EMERGENCY)
Facility: HOSPITAL | Age: 84
Discharge: HOME | End: 2024-11-09
Attending: EMERGENCY MEDICINE | Admitting: EMERGENCY MEDICINE
Payer: MEDICARE

## 2024-11-09 VITALS
TEMPERATURE: 97.8 F | SYSTOLIC BLOOD PRESSURE: 172 MMHG | HEART RATE: 104 BPM | DIASTOLIC BLOOD PRESSURE: 75 MMHG | RESPIRATION RATE: 18 BRPM | BODY MASS INDEX: 34.49 KG/M2 | OXYGEN SATURATION: 100 % | HEIGHT: 65 IN | WEIGHT: 207.01 LBS

## 2024-11-09 DIAGNOSIS — I10 HYPERTENSION: ICD-10-CM

## 2024-11-09 DIAGNOSIS — N39.0 URINARY TRACT INFECTION: ICD-10-CM

## 2024-11-09 DIAGNOSIS — R33.8 ACUTE URINARY RETENTION: Primary | ICD-10-CM

## 2024-11-09 LAB
BACTERIA UR QL AUTO: ABNORMAL /HPF
BILIRUB UR QL STRIP: NEGATIVE
CLARITY UR: ABNORMAL
COLOR UR: ABNORMAL
GLUCOSE UR STRIP-MCNC: NEGATIVE MG/DL
HGB UR QL STRIP.AUTO: ABNORMAL
KETONES UR STRIP-MCNC: NEGATIVE MG/DL
LEGIONELLA SPEC CULT: NORMAL
LEUKOCYTE ESTERASE UR QL STRIP: ABNORMAL
NITRITE UR QL STRIP: POSITIVE
NON-SQ EPI CELLS URNS QL MICRO: ABNORMAL /HPF
PH UR STRIP.AUTO: 5.5 [PH]
PROT UR STRIP-MCNC: NEGATIVE MG/DL
RBC #/AREA URNS AUTO: ABNORMAL /HPF
SP GR UR STRIP.AUTO: 1.01 (ref 1–1.03)
UROBILINOGEN UR STRIP-ACNC: <2 MG/DL
WBC #/AREA URNS AUTO: ABNORMAL /HPF
WBC CLUMPS # UR AUTO: PRESENT /UL

## 2024-11-09 PROCEDURE — 87186 SC STD MICRODIL/AGAR DIL: CPT | Performed by: EMERGENCY MEDICINE

## 2024-11-09 PROCEDURE — 81001 URINALYSIS AUTO W/SCOPE: CPT | Performed by: EMERGENCY MEDICINE

## 2024-11-09 PROCEDURE — 99283 EMERGENCY DEPT VISIT LOW MDM: CPT

## 2024-11-09 PROCEDURE — 99284 EMERGENCY DEPT VISIT MOD MDM: CPT | Performed by: EMERGENCY MEDICINE

## 2024-11-09 PROCEDURE — 87086 URINE CULTURE/COLONY COUNT: CPT | Performed by: EMERGENCY MEDICINE

## 2024-11-09 PROCEDURE — 87077 CULTURE AEROBIC IDENTIFY: CPT | Performed by: EMERGENCY MEDICINE

## 2024-11-09 RX ORDER — ALBUTEROL SULFATE 5 MG/ML
2.5 SOLUTION RESPIRATORY (INHALATION)
COMMUNITY
Start: 2024-10-21

## 2024-11-09 NOTE — ED PROVIDER NOTES
Time reflects when diagnosis was documented in both MDM as applicable and the Disposition within this note       Time User Action Codes Description Comment    11/9/2024  7:41 AM Wilver Nolan Add [R33.8] Acute urinary retention     11/9/2024  7:42 AM Wilver Nolan [N39.0] Urinary tract infection     11/9/2024  7:44 AM Wilver Nolan Add [I10] Hypertension           ED Disposition       ED Disposition   Discharge    Condition   Stable    Date/Time   Sat Nov 9, 2024  7:41 AM    Comment   Renan Gonzales discharge to home/self care.                   Assessment & Plan       Medical Decision Making  Amount and/or Complexity of Data Reviewed  Labs: ordered.        ED Course as of 11/09/24 0835   Sat Nov 09, 2024   0736 On antibiotics for uti started yesterday, will not change abx at this time, no findings to suggest sepsis and lab work is not indicated, will dc to home with whitlock         Medications - No data to display    ED Risk Strat Scores                           SBIRT 22yo+      Flowsheet Row Most Recent Value   Initial Alcohol Screen: US AUDIT-C     1. How often do you have a drink containing alcohol? 0 Filed at: 11/09/2024 0623   2. How many drinks containing alcohol do you have on a typical day you are drinking?  0 Filed at: 11/09/2024 0623   3b. FEMALE Any Age, or MALE 65+: How often do you have 4 or more drinks on one occassion? 0 Filed at: 11/09/2024 0623   Audit-C Score 0 Filed at: 11/09/2024 0623   ELVIN: How many times in the past year have you...    Used an illegal drug or used a prescription medication for non-medical reasons? Never Filed at: 11/09/2024 0623                            History of Present Illness       Chief Complaint   Patient presents with    Difficulty Urinating     Pt arrives from home with difficulty urinating. Pt states he is on antibiotics for blood in his urine since last Tuesday but now he is unable to void at all.       Past Medical History:   Diagnosis Date    BPH with  obstruction/lower urinary tract symptoms     Candidiasis     GERD (gastroesophageal reflux disease)     Obese abdomen     Uncircumcised male       Past Surgical History:   Procedure Laterality Date    CARDIAC PACEMAKER PLACEMENT  07/2020    HEMORROIDECTOMY      NASAL SEPTUM SURGERY      WISDOM TOOTH EXTRACTION        Family History   Problem Relation Age of Onset    Cancer Brother     Diabetes Brother       Social History     Tobacco Use    Smoking status: Former    Smokeless tobacco: Never   Vaping Use    Vaping status: Never Used   Substance Use Topics    Alcohol use: Yes     Comment: social    Drug use: No      E-Cigarette/Vaping    E-Cigarette Use Never User       E-Cigarette/Vaping Substances      I have reviewed and agree with the history as documented.     83-year-old male recently diagnosed urinary tract infection yesterday start on antibiotics presents today for evaluation of difficulty urinating.  Patient states has not been able to urinate since last night.  Complains of suprapubic discomfort.  Denies back/flank pain, penile pain, systemic complaints.      Difficulty Urinating  Presenting symptoms: dysuria    Associated symptoms: no abdominal pain, no diarrhea, no fever, no hematuria, no nausea, no urinary frequency and no vomiting        Review of Systems   Constitutional:  Negative for activity change, appetite change, fatigue and fever.   HENT:  Negative for congestion, dental problem, ear pain, rhinorrhea and sore throat.    Eyes:  Negative for pain and redness.   Respiratory:  Negative for chest tightness, shortness of breath and wheezing.    Cardiovascular:  Negative for chest pain and palpitations.   Gastrointestinal:  Negative for abdominal pain, blood in stool, constipation, diarrhea, nausea and vomiting.   Endocrine: Negative for cold intolerance and heat intolerance.   Genitourinary:  Positive for decreased urine volume, difficulty urinating and dysuria. Negative for frequency and hematuria.    Musculoskeletal:  Negative for arthralgias and myalgias.   Skin:  Negative for color change, pallor and rash.   Neurological:  Negative for weakness and numbness.   Hematological:  Does not bruise/bleed easily.   Psychiatric/Behavioral:  Negative for agitation, hallucinations and suicidal ideas.            Objective       ED Triage Vitals [11/09/24 0605]   Temperature Pulse Blood Pressure Respirations SpO2 Patient Position - Orthostatic VS   97.8 °F (36.6 °C) 104 (!) 172/75 18 100 % --      Temp Source Heart Rate Source BP Location FiO2 (%) Pain Score    Oral Monitor -- -- --      Vitals      Date and Time Temp Pulse SpO2 Resp BP Pain Score FACES Pain Rating User   11/09/24 0605 97.8 °F (36.6 °C) 104 100 % 18 172/75 -- -- CG            Physical Exam  Constitutional:       Appearance: He is well-developed.   HENT:      Head: Normocephalic and atraumatic.   Eyes:      General: No scleral icterus.     Conjunctiva/sclera: Conjunctivae normal.   Neck:      Vascular: No JVD.      Trachea: No tracheal deviation.   Cardiovascular:      Rate and Rhythm: Normal rate and regular rhythm.   Pulmonary:      Effort: Pulmonary effort is normal. No respiratory distress.      Breath sounds: Normal breath sounds.   Abdominal:      General: There is no distension.      Palpations: There is no mass.      Tenderness: There is no abdominal tenderness. There is no right CVA tenderness or left CVA tenderness.      Hernia: No hernia is present.   Musculoskeletal:         General: No deformity. Normal range of motion.      Cervical back: Normal range of motion.   Skin:     Coloration: Skin is not pale.      Findings: No erythema or rash.   Neurological:      General: No focal deficit present.      Mental Status: He is alert and oriented to person, place, and time.      Cranial Nerves: No cranial nerve deficit.      Sensory: No sensory deficit.      Motor: No weakness.      Coordination: Coordination normal.      Gait: Gait normal.    Psychiatric:         Behavior: Behavior normal.         Results Reviewed       Procedure Component Value Units Date/Time    Urine Microscopic [[id number]]  (Abnormal) Collected: 11/09/24 0725    Lab Status: Final result Specimen: Urine, Indwelling Espinosa Catheter Updated: 11/09/24 0739     RBC, UA 30-50 /hpf      WBC, UA Innumerable /hpf      Epithelial Cells None Seen /hpf      Bacteria, UA Occasional /hpf      WBC Clumps Present    Urine culture [[id number]] Collected: 11/09/24 0725    Lab Status: In process Specimen: Urine, Indwelling Espinosa Catheter Updated: 11/09/24 0739    UA w Reflex to Microscopic w Reflex to Culture [[id number]]  (Abnormal) Collected: 11/09/24 0725    Lab Status: Final result Specimen: Urine, Indwelling Espinosa Catheter Updated: 11/09/24 0734     Color, UA Light Yellow     Clarity, UA Turbid     Specific Gravity, UA 1.009     pH, UA 5.5     Leukocytes, UA Large     Nitrite, UA Positive     Protein, UA Negative mg/dl      Glucose, UA Negative mg/dl      Ketones, UA Negative mg/dl      Urobilinogen, UA <2.0 mg/dl      Bilirubin, UA Negative     Occult Blood, UA Large            No orders to display       Procedures    ED Medication and Procedure Management   Prior to Admission Medications   Prescriptions Last Dose Informant Patient Reported? Taking?   Cholecalciferol (VITAMIN D) 2000 units CAPS  Self Yes Yes   Sig: Take by mouth daily   Qvar RediHaler 40 MCG/ACT inhaler Not Taking  Yes No   Patient not taking: Reported on 11/9/2024   albuterol (Albuterol Sulfate) (5 mg/mL) 0.5 % nebulizer solution   Yes Yes   Sig: Inhale 2.5 mg   albuterol (PROVENTIL HFA,VENTOLIN HFA) 90 mcg/act inhaler Not Taking Self Yes No   Sig: Inhale 2 puffs every 4 (four) hours as needed   Patient not taking: Reported on 11/9/2024   cetirizine (ZyrTEC) 10 mg tablet  Self Yes Yes   Sig: Take 10 mg by mouth   famotidine (PEPCID) 20 mg tablet Not Taking Self Yes No   Patient not taking: Reported on 7/11/2024   finasteride  (PROSCAR) 5 mg tablet   No Yes   Sig: Take 1 tablet (5 mg total) by mouth daily   fluticasone (FLONASE) 50 mcg/act nasal spray  Self Yes Yes   ipratropium (ATROVENT) 0.02 % nebulizer solution   Yes Yes   Sig: INHALE 1 VIAL IN NEBULIZER ONCE DAILY AFTER SUPPER   metoprolol tartrate (LOPRESSOR) 25 mg tablet Not Taking Self Yes No   Sig: TAKE 1 2 (ONE HALF) TABLET BY MOUTH TWICE DAILY   Patient not taking: Reported on 4/21/2023   montelukast (SINGULAIR) 10 mg tablet  Self Yes Yes   Sig: daily   omeprazole (PriLOSEC) 20 mg delayed release capsule Not Taking  Yes No   Patient not taking: Reported on 9/10/2024   phenazopyridine (PYRIDIUM) 200 mg tablet Not Taking  No No   Sig: Take 1 tablet (200 mg total) by mouth 3 (three) times a day with meals   Patient not taking: Reported on 9/10/2024   simvastatin (ZOCOR) 10 mg tablet  Self Yes Yes   simvastatin (ZOCOR) 20 mg tablet Not Taking Self Yes No   Sig: 10 mg daily     Patient not taking: Reported on 11/9/2024   sulfamethoxazole-trimethoprim (BACTRIM DS) 800-160 mg per tablet   No Yes   Sig: Take 1 tablet by mouth every 12 (twelve) hours for 7 days   tamsulosin (FLOMAX) 0.4 mg Not Taking  No No   Sig: Take 1 capsule (0.4 mg total) by mouth daily with dinner   Patient not taking: Reported on 11/9/2024   varenicline (CHANTIX YIN) 0.5 MG X 11 & 1 MG X 42 tablet   Yes No   Sig: Use as directed. Give with meals and with a full glass of water.      Facility-Administered Medications: None     Discharge Medication List as of 11/9/2024  7:45 AM        CONTINUE these medications which have NOT CHANGED    Details   albuterol (Albuterol Sulfate) (5 mg/mL) 0.5 % nebulizer solution Inhale 2.5 mg, Starting Mon 10/21/2024, Historical Med      cetirizine (ZyrTEC) 10 mg tablet Take 10 mg by mouth, Starting Wed 6/25/2014, Historical Med      Cholecalciferol (VITAMIN D) 2000 units CAPS Take by mouth daily, Historical Med      finasteride (PROSCAR) 5 mg tablet Take 1 tablet (5 mg total) by  mouth daily, Starting Tue 9/10/2024, Normal      fluticasone (FLONASE) 50 mcg/act nasal spray Starting Fri 8/10/2018, Historical Med      ipratropium (ATROVENT) 0.02 % nebulizer solution INHALE 1 VIAL IN NEBULIZER ONCE DAILY AFTER SUPPER, Historical Med      montelukast (SINGULAIR) 10 mg tablet daily, Starting Wed 7/18/2018, Historical Med      !! simvastatin (ZOCOR) 10 mg tablet Starting Wed 2/1/2023, Historical Med      sulfamethoxazole-trimethoprim (BACTRIM DS) 800-160 mg per tablet Take 1 tablet by mouth every 12 (twelve) hours for 7 days, Starting Fri 11/8/2024, Until Fri 11/15/2024, Normal      albuterol (PROVENTIL HFA,VENTOLIN HFA) 90 mcg/act inhaler Inhale 2 puffs every 4 (four) hours as needed, Historical Med      famotidine (PEPCID) 20 mg tablet Starting Wed 8/12/2020, Historical Med      metoprolol tartrate (LOPRESSOR) 25 mg tablet TAKE 1 2 (ONE HALF) TABLET BY MOUTH TWICE DAILY, Historical Med      omeprazole (PriLOSEC) 20 mg delayed release capsule Historical Med      phenazopyridine (PYRIDIUM) 200 mg tablet Take 1 tablet (200 mg total) by mouth 3 (three) times a day with meals, Starting Mon 8/5/2024, Normal      Qvar RediHaler 40 MCG/ACT inhaler Historical Med      !! simvastatin (ZOCOR) 20 mg tablet 10 mg daily  , Starting Fri 6/22/2018, Historical Med      tamsulosin (FLOMAX) 0.4 mg Take 1 capsule (0.4 mg total) by mouth daily with dinner, Starting Tue 9/10/2024, Normal      varenicline (CHANTIX YIN) 0.5 MG X 11 & 1 MG X 42 tablet Use as directed. Give with meals and with a full glass of water., Historical Med       !! - Potential duplicate medications found. Please discuss with provider.        No discharge procedures on file.  ED SEPSIS DOCUMENTATION   Time reflects when diagnosis was documented in both MDM as applicable and the Disposition within this note       Time User Action Codes Description Comment    11/9/2024  7:41 AM Wilver Nolan [R33.8] Acute urinary retention     11/9/2024  7:42 AM  Wilver Nolan Add [N39.0] Urinary tract infection     11/9/2024  7:44 AM Wilver Nolan Add [I10] Hypertension                  Wilver Nolan MD  11/09/24 0835

## 2024-11-11 ENCOUNTER — DOCUMENTATION (OUTPATIENT)
Dept: UROLOGY | Facility: MEDICAL CENTER | Age: 84
End: 2024-11-11

## 2024-11-11 ENCOUNTER — NURSE TRIAGE (OUTPATIENT)
Dept: UROLOGY | Facility: MEDICAL CENTER | Age: 84
End: 2024-11-11

## 2024-11-11 LAB — BACTERIA UR CULT: ABNORMAL

## 2024-11-11 NOTE — TELEPHONE ENCOUNTER
Please call the patient advise him that the blood in his Espinosa bag as well as the clots are secondary to likely both his ongoing UTI and recent Espinosa catheter placement.  As long as the Espinosa catheter continues to drain adequately into the back, then this is reassuring.  Patient should continue to adequately hydrate with water as this will be the best way to clear the blood from his urine.  Additionally, the patient should take his antibiotic until completion as advised.  Patient should be scheduled for a trial of void in the office sometime early next week after he finishes his course of antibiotics.  Please note to the patient that if his Espinosa catheter stops draining urine then I would like him to return to the emergency department.  Additionally, if the patient experiences fevers, chills, severe lower abdominal pain, nausea, or vomiting then he should also return to the emergency department.

## 2024-11-11 NOTE — TELEPHONE ENCOUNTER
Pt calling for recommendations. Patient went to the ER on Saturday and had a whitlock catheter placed and now he is seeing blood and small clots in the bag.    Transferred call to triage. Also informed him of positive urine culture results and to continue the antibiotic he is on.

## 2024-11-11 NOTE — RESULT ENCOUNTER NOTE
Inform pt of abnormal test result.  Urine culture was positive.  The Bactrim he is on should be effective.  Does he have an appointment to follow-up?

## 2024-11-11 NOTE — TELEPHONE ENCOUNTER
Left VM informing patient of positive urine culture result and the abx given should be effective in clearing out the infection.  I reminded patient of 12/2 appt with Dr. Rea and to do his urine culture prior to appt. Left office #[phone].

## 2024-11-11 NOTE — TELEPHONE ENCOUNTER
Patient called in stating he was seen in the ED on 11/9/2024 for difficulty urinating, at that they placed a whitlock catheter. He is continuing to have blood and blood clots in his urine. The color is bright red and his blood clots are a quarter of an inch. He was told to follow up with our office from the ED. He is wondering how long he has to keep his whitlock in. He continues to take his antibiotic as prescribed. Please advise.

## 2024-11-12 NOTE — TELEPHONE ENCOUNTER
Patient calling in and informed of Bc's message above    Patient does complain of some constipation. Discussed taking Senokot, agreeable. Increase water to 64 ounces daily. Taking antibiotics as prescribed has 2 more days until completed    Urine is pink, some pea size blood clots yesterday    Scheduled TOV for Monday 11/18/24    ED precautions reviewed

## 2024-11-18 ENCOUNTER — HOSPITAL ENCOUNTER (EMERGENCY)
Facility: HOSPITAL | Age: 84
Discharge: HOME | End: 2024-11-18
Attending: EMERGENCY MEDICINE
Payer: MEDICARE

## 2024-11-18 ENCOUNTER — NURSE TRIAGE (OUTPATIENT)
Dept: OTHER | Facility: OTHER | Age: 84
End: 2024-11-18

## 2024-11-18 ENCOUNTER — PROCEDURE VISIT (OUTPATIENT)
Dept: UROLOGY | Facility: MEDICAL CENTER | Age: 84
End: 2024-11-18
Payer: MEDICARE

## 2024-11-18 VITALS
DIASTOLIC BLOOD PRESSURE: 70 MMHG | SYSTOLIC BLOOD PRESSURE: 130 MMHG | BODY MASS INDEX: 32.82 KG/M2 | WEIGHT: 197 LBS | HEART RATE: 84 BPM | OXYGEN SATURATION: 94 % | HEIGHT: 65 IN

## 2024-11-18 VITALS
SYSTOLIC BLOOD PRESSURE: 138 MMHG | RESPIRATION RATE: 17 BRPM | OXYGEN SATURATION: 95 % | BODY MASS INDEX: 34.08 KG/M2 | WEIGHT: 204.81 LBS | TEMPERATURE: 97.9 F | HEART RATE: 59 BPM | DIASTOLIC BLOOD PRESSURE: 65 MMHG

## 2024-11-18 DIAGNOSIS — N13.8 BPH WITH OBSTRUCTION/LOWER URINARY TRACT SYMPTOMS: Primary | ICD-10-CM

## 2024-11-18 DIAGNOSIS — R33.8 ACUTE URINARY RETENTION: Primary | ICD-10-CM

## 2024-11-18 DIAGNOSIS — N40.1 BPH WITH OBSTRUCTION/LOWER URINARY TRACT SYMPTOMS: Primary | ICD-10-CM

## 2024-11-18 DIAGNOSIS — R31.9 HEMATURIA, UNSPECIFIED TYPE: ICD-10-CM

## 2024-11-18 LAB — POST-VOID RESIDUAL VOLUME, ML POC: 182 ML

## 2024-11-18 PROCEDURE — 99284 EMERGENCY DEPT VISIT MOD MDM: CPT | Performed by: EMERGENCY MEDICINE

## 2024-11-18 PROCEDURE — 51798 US URINE CAPACITY MEASURE: CPT

## 2024-11-18 PROCEDURE — 99024 POSTOP FOLLOW-UP VISIT: CPT

## 2024-11-18 PROCEDURE — 99283 EMERGENCY DEPT VISIT LOW MDM: CPT

## 2024-11-18 RX ORDER — LIDOCAINE HYDROCHLORIDE 20 MG/ML
1 JELLY TOPICAL ONCE
Status: COMPLETED | OUTPATIENT
Start: 2024-11-18 | End: 2024-11-18

## 2024-11-18 RX ADMIN — LIDOCAINE HYDROCHLORIDE 1 APPLICATION: 20 JELLY TOPICAL at 21:20

## 2024-11-18 NOTE — PROGRESS NOTES
"11/18/2024    Renan Gonzales  1940  [id number]    Diagnosis      Patient presents for whitlock removal AM/PVR PM managed by Dr. Rea    Plan  Return for scheduled FU w/ Dr. Rea 12/02/2024    Procedure Whitlock removal/voiding trial    Whitlock catheter removed after deflation of an intact balloon. Patient tolerated well. Encouraged patient to hydrate well and return this afternoon for post void residual. He knows he may return early if uncomfortable and unable to urinate. Patient agrees to this plan.    Patient returned this afternoon. Patient states able to void. Patient voided in office prior. Bladder ultrasound performed and PVR measured 182 ml.    No catheter placement at this time. Pt was made aware of ED precautions and encouraged to stay well hydrated. Advised if at any time he feels uncomfortable or feeling pressure to call our office. Pt understood and stated \"he knows how it feels since it happened the last time and won't let it go as long as before\".         Vitals:    11/18/24 0829   BP: 130/70   BP Location: Left arm   Patient Position: Sitting   Cuff Size: Adult   Pulse: 84   SpO2: 94%   Weight: 89.4 kg (197 lb)   Height: 5' 5\" (1.651 m)         Christina Kearney RN       "

## 2024-11-19 NOTE — TELEPHONE ENCOUNTER
"Reason for Disposition   [1] Unable to urinate (or only a few drops) > 4 hours AND [2] bladder feels very full (e.g., palpable bladder or strong urge to urinate)     (2 hours).    Answer Assessment - Initial Assessment Questions  1. SYMPTOM: \"What's the main symptom you're concerned about?\" (e.g., frequency, incontinence)      Pt had his catheter removed today and was urinating fine but now can only urinate a few drops.  Has urgency as well.    2. ONSET: \"When did this start?\"      2 hours ago    3. PAIN: \"Is there any pain?\" If Yes, ask: \"How bad is it?\" (Scale: 1-10; mild, moderate, severe)      Pressure in penis    4. OTHER SYMPTOMS: \"Do you have any other symptoms?\" (e.g., blood in urine, fever, flank pain, pain with urination)      Denies     Was drinking a lot of fluid as instructed, but now has not been since symptoms began.    Protocols used: Urinary Symptoms-Adult-    "

## 2024-11-19 NOTE — TELEPHONE ENCOUNTER
Contacted on-call provider who recommends patient go to ED for bladder scan and possible repeat Espinosa catheter.      Contacted patient to make him aware of on-call provider's recommendation.  He was agreeable and will be going to [facility] ED shortly.

## 2024-11-19 NOTE — ED PROVIDER NOTES
Time reflects when diagnosis was documented in both MDM as applicable and the Disposition within this note       Time User Action Codes Description Comment    11/18/2024 10:20 PM Tatum Wooten Add [R33.8] Acute urinary retention           ED Disposition       ED Disposition   Discharge    Condition   Stable    Date/Time   Mon Nov 18, 2024 10:20 PM    Comment   Renan Christian discharge to home/self care.                   Assessment & Plan       Medical Decision Making  An 83-year-old male presents with acute urinary retention after having a Whitlock catheter removed earlier today.  Bladder scan with > 600 mL.  Will proceed with catheter placement.    Risk  Prescription drug management.        ED Course as of 11/18/24 2231 Mon Nov 18, 2024 2207 Whitlock catheter placed, draining yellow urine.  Will proceed with discharge home, recommending urology follow up.       Medications   lidocaine (URO-JET) 2 % urethral/mucosal gel 1 Application (1 Application Urethral Given 11/18/24 2120)       ED Risk Strat Scores                           SBIRT 22yo+      Flowsheet Row Most Recent Value   Initial Alcohol Screen: US AUDIT-C     1. How often do you have a drink containing alcohol? 0 Filed at: 11/18/2024 2138   2. How many drinks containing alcohol do you have on a typical day you are drinking?  0 Filed at: 11/18/2024 2138   3b. FEMALE Any Age, or MALE 65+: How often do you have 4 or more drinks on one occassion? 0 Filed at: 11/18/2024 2138   Audit-C Score 0 Filed at: 11/18/2024 2138   ELVIN: How many times in the past year have you...    Used an illegal drug or used a prescription medication for non-medical reasons? Never Filed at: 11/18/2024 2138                            History of Present Illness       Chief Complaint   Patient presents with    Urinary Retention     Pt reports hx of BPH, states he had a whitlock catheter removed this morning that was in for a week. Pt states 3-4 hours after removal he is urinating but unable  to empty his bladder.       Past Medical History:   Diagnosis Date    BPH with obstruction/lower urinary tract symptoms     Candidiasis     GERD (gastroesophageal reflux disease)     Obese abdomen     Uncircumcised male       Past Surgical History:   Procedure Laterality Date    CARDIAC PACEMAKER PLACEMENT  07/2020    HEMORROIDECTOMY      NASAL SEPTUM SURGERY      WISDOM TOOTH EXTRACTION        Family History   Problem Relation Age of Onset    Cancer Brother     Diabetes Brother       Social History     Tobacco Use    Smoking status: Former    Smokeless tobacco: Never   Vaping Use    Vaping status: Never Used   Substance Use Topics    Alcohol use: Yes     Comment: social    Drug use: No      E-Cigarette/Vaping    E-Cigarette Use Never User       E-Cigarette/Vaping Substances      I have reviewed and agree with the history as documented.     An 83-year-old male with past medical history of BPH, asthma and hyperlipidemia; presents with acute urinary retention.  Patient states he was initially seen in the ED on 11/9 for urinary retention, undergoing Espinosa placement.  Patient's Espinosa was subsequently removed this morning, he was initially urinating without issue however over the past 3-4 hours has only been able to pass a small amount of urine.  He does complain of lower abdominal pressure.  Patient has otherwise not had fever, chills, chest pain, shortness of breath, nausea, vomiting, diarrhea, abdominal pain, peripheral edema and rashes.  Pt does report recently completing a course of antibiotics for a UTI.      History provided by:  Patient and medical records      Review of Systems   Genitourinary:  Positive for difficulty urinating.   All other systems reviewed and are negative.      Objective       ED Triage Vitals [11/18/24 2047]   Temperature Pulse Blood Pressure Respirations SpO2 Patient Position - Orthostatic VS   97.9 °F (36.6 °C) 95 (!) 200/87 18 96 % Sitting      Temp Source Heart Rate Source BP Location  FiO2 (%) Pain Score    Oral Monitor Right arm -- No Pain      Vitals      Date and Time Temp Pulse SpO2 Resp BP Pain Score FACES Pain Rating User   11/18/24 2226 -- 59 95 % 17 138/65 -- -- AA   11/18/24 2207 -- -- -- -- 141/64 -- -- AM   11/18/24 2047 97.9 °F (36.6 °C) 95 96 % 18 200/87 No Pain -- AJ            Physical Exam  General Appearance: alert and oriented, nad, non toxic appearing  Skin:  Warm, dry, intact.  No cyanosis  HEENT: Atraumatic, normocephalic.  No eye drainage.  Normal hearing.  Moist mucous membranes.    Neck: Supple, trachea midline  Cardiac: RRR; no murmurs, rub, gallops.  No pedal edema, 2+ pulses  Pulmonary: lungs CTAB; no wheezes, rales, rhonchi  Gastrointestinal: abdomen soft, palpable distended bladder with suprapubic tenderness; no guarding or rebound tenderness; good bowel sounds, no mass or bruits  Extremities:  No deformities.  No calf tenderness, no clubbing  Neuro:  no focal motor or sensory deficits, CN 2-12 grossly intact  Psych:  Normal mood and affect, normal judgement and insight       Results Reviewed       None            No orders to display       Procedures    ED Medication and Procedure Management   Prior to Admission Medications   Prescriptions Last Dose Informant Patient Reported? Taking?   Cholecalciferol (VITAMIN D) 2000 units CAPS  Self Yes No   Sig: Take by mouth daily   Qvar RediHaler 40 MCG/ACT inhaler   Yes No   Patient not taking: Reported on 11/18/2024   albuterol (Albuterol Sulfate) (5 mg/mL) 0.5 % nebulizer solution   Yes No   Sig: Inhale 2.5 mg   albuterol (PROVENTIL HFA,VENTOLIN HFA) 90 mcg/act inhaler  Self Yes No   Sig: Inhale 2 puffs every 4 (four) hours as needed   Patient not taking: Reported on 11/9/2024   cetirizine (ZyrTEC) 10 mg tablet  Self Yes No   Sig: Take 10 mg by mouth   famotidine (PEPCID) 20 mg tablet  Self Yes No   Patient not taking: Reported on 7/11/2024   finasteride (PROSCAR) 5 mg tablet   No No   Sig: Take 1 tablet (5 mg total) by  mouth daily   fluticasone (FLONASE) 50 mcg/act nasal spray  Self Yes No   ipratropium (ATROVENT) 0.02 % nebulizer solution   Yes No   Sig: INHALE 1 VIAL IN NEBULIZER ONCE DAILY AFTER SUPPER   metoprolol tartrate (LOPRESSOR) 25 mg tablet  Self Yes No   Sig: TAKE 1 2 (ONE HALF) TABLET BY MOUTH TWICE DAILY   Patient not taking: Reported on 4/21/2023   montelukast (SINGULAIR) 10 mg tablet  Self Yes No   Sig: daily   omeprazole (PriLOSEC) 20 mg delayed release capsule   Yes No   Patient not taking: Reported on 11/18/2024   phenazopyridine (PYRIDIUM) 200 mg tablet   No No   Sig: Take 1 tablet (200 mg total) by mouth 3 (three) times a day with meals   Patient not taking: Reported on 11/18/2024   simvastatin (ZOCOR) 10 mg tablet  Self Yes No   simvastatin (ZOCOR) 20 mg tablet  Self Yes No   Sig: 10 mg daily     Patient not taking: Reported on 11/9/2024   tamsulosin (FLOMAX) 0.4 mg   No No   Sig: Take 1 capsule (0.4 mg total) by mouth daily with dinner   varenicline (CHANTIX YIN) 0.5 MG X 11 & 1 MG X 42 tablet   Yes No   Sig: Use as directed. Give with meals and with a full glass of water.      Facility-Administered Medications: None     Current Discharge Medication List        CONTINUE these medications which have NOT CHANGED    Details   albuterol (Albuterol Sulfate) (5 mg/mL) 0.5 % nebulizer solution Inhale 2.5 mg      albuterol (PROVENTIL HFA,VENTOLIN HFA) 90 mcg/act inhaler Inhale 2 puffs every 4 (four) hours as needed    Comments: <!--EPICS-->Substitution to a formulary equivalent within the same pharmaceutical class is authorized.<!--EPICE-->      cetirizine (ZyrTEC) 10 mg tablet Take 10 mg by mouth      Cholecalciferol (VITAMIN D) 2000 units CAPS Take by mouth daily      famotidine (PEPCID) 20 mg tablet       finasteride (PROSCAR) 5 mg tablet Take 1 tablet (5 mg total) by mouth daily  Qty: 90 tablet, Refills: 3    Associated Diagnoses: BPH with obstruction/lower urinary tract symptoms      fluticasone (FLONASE) 50  mcg/act nasal spray       ipratropium (ATROVENT) 0.02 % nebulizer solution INHALE 1 VIAL IN NEBULIZER ONCE DAILY AFTER SUPPER      metoprolol tartrate (LOPRESSOR) 25 mg tablet TAKE 1 2 (ONE HALF) TABLET BY MOUTH TWICE DAILY      montelukast (SINGULAIR) 10 mg tablet daily      omeprazole (PriLOSEC) 20 mg delayed release capsule       phenazopyridine (PYRIDIUM) 200 mg tablet Take 1 tablet (200 mg total) by mouth 3 (three) times a day with meals  Qty: 10 tablet, Refills: 0    Associated Diagnoses: Dysuria      Qvar RediHaler 40 MCG/ACT inhaler       !! simvastatin (ZOCOR) 10 mg tablet       !! simvastatin (ZOCOR) 20 mg tablet 10 mg daily        tamsulosin (FLOMAX) 0.4 mg Take 1 capsule (0.4 mg total) by mouth daily with dinner  Qty: 90 capsule, Refills: 3    Associated Diagnoses: BPH with obstruction/lower urinary tract symptoms      varenicline (CHANTIX YIN) 0.5 MG X 11 & 1 MG X 42 tablet Use as directed. Give with meals and with a full glass of water.       !! - Potential duplicate medications found. Please discuss with provider.        No discharge procedures on file.  ED SEPSIS DOCUMENTATION   Time reflects when diagnosis was documented in both MDM as applicable and the Disposition within this note       Time User Action Codes Description Comment    11/18/2024 10:20 PM Tatum Wooten [R33.8] Acute urinary retention                  Tatum Wooten DO  11/18/24 2235

## 2024-11-20 NOTE — TELEPHONE ENCOUNTER
Call placed to pt and spoke with him. Informed him of the MD recommendations and plans for TOV. Pt is in agreement and is scheduled on 12-2-2024 at 8:30am with the nurse for whitlock removal and will FU with MD in the afternoon for cysto and PVR.     Pt is aware he can contact the office with any questions or concerns he should have prior to this appointment.

## 2024-12-02 ENCOUNTER — PROCEDURE VISIT (OUTPATIENT)
Dept: UROLOGY | Facility: MEDICAL CENTER | Age: 84
End: 2024-12-02
Payer: MEDICARE

## 2024-12-02 VITALS
HEIGHT: 65 IN | BODY MASS INDEX: 32.65 KG/M2 | WEIGHT: 196 LBS | DIASTOLIC BLOOD PRESSURE: 68 MMHG | OXYGEN SATURATION: 90 % | SYSTOLIC BLOOD PRESSURE: 140 MMHG | HEART RATE: 97 BPM

## 2024-12-02 VITALS
BODY MASS INDEX: 32.65 KG/M2 | HEIGHT: 65 IN | SYSTOLIC BLOOD PRESSURE: 150 MMHG | HEART RATE: 98 BPM | OXYGEN SATURATION: 94 % | DIASTOLIC BLOOD PRESSURE: 68 MMHG | WEIGHT: 196 LBS

## 2024-12-02 DIAGNOSIS — N13.8 ENLARGED PROSTATE WITH URINARY OBSTRUCTION: Primary | ICD-10-CM

## 2024-12-02 DIAGNOSIS — N40.1 BPH WITH OBSTRUCTION/LOWER URINARY TRACT SYMPTOMS: ICD-10-CM

## 2024-12-02 DIAGNOSIS — N40.1 BPH WITH OBSTRUCTION/LOWER URINARY TRACT SYMPTOMS: Primary | ICD-10-CM

## 2024-12-02 DIAGNOSIS — R33.9 URINARY RETENTION: ICD-10-CM

## 2024-12-02 DIAGNOSIS — N13.8 BPH WITH OBSTRUCTION/LOWER URINARY TRACT SYMPTOMS: Primary | ICD-10-CM

## 2024-12-02 DIAGNOSIS — R31.9 HEMATURIA, UNSPECIFIED TYPE: ICD-10-CM

## 2024-12-02 DIAGNOSIS — N13.8 BPH WITH OBSTRUCTION/LOWER URINARY TRACT SYMPTOMS: ICD-10-CM

## 2024-12-02 DIAGNOSIS — N40.1 ENLARGED PROSTATE WITH URINARY OBSTRUCTION: Primary | ICD-10-CM

## 2024-12-02 LAB
POST-VOID RESIDUAL VOLUME, ML POC: 25 ML
SL AMB  POCT GLUCOSE, UA: NORMAL
SL AMB LEUKOCYTE ESTERASE,UA: NORMAL
SL AMB POCT BILIRUBIN,UA: NORMAL
SL AMB POCT BLOOD,UA: NORMAL
SL AMB POCT CLARITY,UA: CLEAR
SL AMB POCT COLOR,UA: YELLOW
SL AMB POCT KETONES,UA: NORMAL
SL AMB POCT NITRITE,UA: NORMAL
SL AMB POCT PH,UA: 5.5
SL AMB POCT SPECIFIC GRAVITY,UA: 1
SL AMB POCT URINE PROTEIN: NORMAL
SL AMB POCT UROBILINOGEN: 0.2

## 2024-12-02 PROCEDURE — 76872 US TRANSRECTAL: CPT | Performed by: UROLOGY

## 2024-12-02 PROCEDURE — 81003 URINALYSIS AUTO W/O SCOPE: CPT | Performed by: UROLOGY

## 2024-12-02 PROCEDURE — 99214 OFFICE O/P EST MOD 30 MIN: CPT | Performed by: UROLOGY

## 2024-12-02 PROCEDURE — 99024 POSTOP FOLLOW-UP VISIT: CPT

## 2024-12-02 PROCEDURE — 51798 US URINE CAPACITY MEASURE: CPT | Performed by: UROLOGY

## 2024-12-02 PROCEDURE — 52000 CYSTOURETHROSCOPY: CPT | Performed by: UROLOGY

## 2024-12-02 RX ORDER — ALBUTEROL SULFATE 0.83 MG/ML
SOLUTION RESPIRATORY (INHALATION)
COMMUNITY
Start: 2024-11-19

## 2024-12-02 NOTE — H&P
Cystoscopy     Date/Time  12/2/2024 2:30 PM     Performed by  Brandyn Rea MD   Authorized by  Brandyn Rea MD     Universal Protocol:  Consent: Verbal consent obtained. Written consent obtained.  Risks and benefits: risks, benefits and alternatives were discussed  Consent given by: patient  Patient understanding: patient states understanding of the procedure being performed  Patient consent: the patient's understanding of the procedure matches consent given  Procedure consent: procedure consent matches procedure scheduled  Patient identity confirmed: verbally with patient      Procedure Details:  Procedure type: cystoscopy    Additional Procedure Details: The patient presents for cystoscopy, transrectal ultrasound and uroflow study for further evaluation of his lower urinary tract symptoms.  I have discussed the reasons for the testing and the potential risks and complications.  Patient expressed understanding, and signed informed consent document.    Flexible Cystoscopy    The patient was carefully  positioned supine on the examining table.  He was then prepped and draped in the usual sterile fashion..  Xylocaine jelly was instilled in the urethra and left  indwelling to affect local anesthesia.  Flexible cystoscopy was then performed with the 15 [place] flexible cystoscope with the following findings.      Urethra: normal    Prostate:  lateral lobes:bilobar obstruction                  median lobe moderate intravesical median lobe    Bladder: mild to mod trabeculation, cath rxn , no tumor, or stones.     Residual urine:small    The patient tolerated the cystoscopy well.  He was placed on his left side in preparation for transrectal ultrasound of prostate.    Transrectal Ultrasound of the Prostate    Transrectal ultrasound was performed with the transrectal probe at 8 megahertz.  Digital rectal examination was performed with findings of large benign feeling prostate.  The lubricated transrectal probe  inserted into the rectum.  The seminal vessels are normal in appearance.  The prostate is enlarged measuring 158 gm. Prostatic height is 6 cm, length 6.8 cm and width 7.43 cm.  No hypoechoic lesion is noted in the peripheral zone.  The transition zone is heterogeneous in echotexture with scattered cysts and calcifications.  A median lobe is    seen.  Visualized bladder is unremarkable.    Impression:  Prostatic enlargement              Name: Renan Gonzales      : 1940      MRN: [id number]  Encounter Provider: Brandyn Rea MD  Encounter Date: 2024   Encounter department: [facility] [place]  :  Assessment & Plan  Enlarged prostate with urinary obstruction  Prostate is very large and obstructing. He is at high risk of recurrent retention. Options were discussed including Robotic simple prostatectomy, HOLEP and PVP/GLL. Pros and cons discussed. At conclusion of the discussion he wished to proceed with PVP/GLL(laser TURP). Procedure described and risks discussed. Consent signed.   Orders:    POCT urine dip auto non-scope    POCT Measure PVR    Cystoscopy    Case request operating room: TRANSURETHRAL RESECTION OF PROSTATE W/ LASER; Standing    CBC and differential; Future    EKG 12 lead; Future    BPH with obstruction/lower urinary tract symptoms  As above  Orders:    POCT urine dip auto non-scope    POCT Measure PVR    Hematuria, unspecified type    Orders:    POCT urine dip auto non-scope    POCT Measure PVR        History of Present Illness  Renan Gonzales is a 84 y.o. male who presents for evaluation of prostatic obstruction and urinary retention. He is on combined therapy with flomax and finasteride. Espinosa removed this AM and he did void successfully. He is here now for cysto and TRUS.    Review of Systems   Constitutional:  Negative for chills, diaphoresis, fatigue and fever.   HENT: Negative.     Eyes: Negative.    Respiratory: Negative.     Cardiovascular: Negative.     Endocrine: Negative.    Genitourinary:         See HPI   Musculoskeletal: Negative.    Skin: Negative.    Allergic/Immunologic: Negative.    Neurological: Negative.    Hematological: Negative.    Psychiatric/Behavioral: Negative.       Current Outpatient Medications on File Prior to Visit   Medication Sig Dispense Refill    albuterol (Albuterol Sulfate) (5 mg/mL) 0.5 % nebulizer solution Inhale 2.5 mg      cetirizine (ZyrTEC) 10 mg tablet Take 10 mg by mouth      Cholecalciferol (VITAMIN D) 2000 units CAPS Take by mouth daily      finasteride (PROSCAR) 5 mg tablet Take 1 tablet (5 mg total) by mouth daily 90 tablet 3    fluticasone (FLONASE) 50 mcg/act nasal spray       montelukast (SINGULAIR) 10 mg tablet daily      simvastatin (ZOCOR) 10 mg tablet       tamsulosin (FLOMAX) 0.4 mg Take 1 capsule (0.4 mg total) by mouth daily with dinner 90 capsule 3    albuterol (PROVENTIL HFA,VENTOLIN HFA) 90 mcg/act inhaler Inhale 2 puffs every 4 (four) hours as needed (Patient not taking: Reported on 11/9/2024)      famotidine (PEPCID) 20 mg tablet  (Patient not taking: Reported on 7/11/2024)      ipratropium (ATROVENT) 0.02 % nebulizer solution INHALE 1 VIAL IN NEBULIZER ONCE DAILY AFTER SUPPER      metoprolol tartrate (LOPRESSOR) 25 mg tablet TAKE 1 2 (ONE HALF) TABLET BY MOUTH TWICE DAILY (Patient not taking: Reported on 4/21/2023)      omeprazole (PriLOSEC) 20 mg delayed release capsule  (Patient not taking: Reported on 12/2/2024)      phenazopyridine (PYRIDIUM) 200 mg tablet Take 1 tablet (200 mg total) by mouth 3 (three) times a day with meals (Patient not taking: Reported on 12/2/2024) 10 tablet 0    Qvar RediHaler 40 MCG/ACT inhaler  (Patient not taking: Reported on 12/2/2024)      simvastatin (ZOCOR) 20 mg tablet 10 mg daily   (Patient not taking: Reported on 11/9/2024)      varenicline (CHANTIX YIN) 0.5 MG X 11 & 1 MG X 42 tablet Use as directed. Give with meals and with a full glass of water.       No current  "facility-administered medications on file prior to visit.         Objective  /68 (BP Location: Left arm, Patient Position: Sitting, Cuff Size: Adult)   Pulse 98   Ht 5' 5\" (1.651 m)   Wt 88.9 kg (196 lb)   SpO2 94%   BMI 32.62 kg/m²     Physical Exam  Vitals reviewed.   Constitutional:       General: He is not in acute distress.     Appearance: Normal appearance. He is well-developed and normal weight. He is not ill-appearing, toxic-appearing or diaphoretic.   HENT:      Head: Normocephalic and atraumatic.   Eyes:      General: No scleral icterus.     Conjunctiva/sclera: Conjunctivae normal.   Cardiovascular:      Rate and Rhythm: Normal rate.   Pulmonary:      Effort: Pulmonary effort is normal.   Abdominal:      General: Bowel sounds are normal. There is no distension.      Palpations: Abdomen is soft. There is no mass.      Tenderness: There is no abdominal tenderness. There is no right CVA tenderness, left CVA tenderness, guarding or rebound.      Hernia: No hernia is present.   Genitourinary:     Penis: Normal. No phimosis or hypospadias.       Testes: Normal.         Right: Mass not present.         Left: Mass not present.      Rectum: Normal.      Comments: Large benign feeling prostate  Musculoskeletal:         General: Normal range of motion.      Cervical back: Neck supple.   Skin:     General: Skin is warm and dry.   Neurological:      General: No focal deficit present.      Mental Status: He is alert and oriented to person, place, and time.   Psychiatric:         Mood and Affect: Mood normal.         Behavior: Behavior normal.         Thought Content: Thought content normal.         Judgment: Judgment normal.          Results  Lab Results   Component Value Date    PSA 2.37 09/06/2024    PSA 0.8 11/18/2020     Lab Results   Component Value Date    CALCIUM 9.1 09/06/2024    K 4.1 09/06/2024    CO2 28 09/06/2024     09/06/2024    BUN 18 09/06/2024    CREATININE 0.89 09/06/2024     No " "results found for: \"WBC\", \"HGB\", \"HCT\", \"MCV\", \"PLT\"    Office Urine Dip  Recent Results (from the past hour)   POCT urine dip auto non-scope    Collection Time: 12/02/24  2:44 PM   Result Value Ref Range     COLOR,UA yellow     CLARITY,UA clear     SPECIFIC GRAVITY,UA 1.005      PH,UA 5.5     LEUKOCYTE ESTERASE,UA tr     NITRITE,UA n     GLUCOSE, UA n     KETONES,UA n     BILIRUBIN,UA n     BLOOD,UA tr     POCT URINE PROTEIN n     SL AMB POCT UROBILINOGEN 0.2    POCT Measure PVR    Collection Time: 12/02/24  2:49 PM   Result Value Ref Range    POST-VOID RESIDUAL VOLUME, ML POC 25 mL   ]       "

## 2024-12-02 NOTE — PROGRESS NOTES
Name: Renan Gonzales      : 1940      MRN: [id number]  Encounter Provider: Brandyn Rea MD  Encounter Date: 2024   Encounter department: [facility] UROLOGY [place]  :  Assessment & Plan  Enlarged prostate with urinary obstruction  Prostate is very large and obstructing. He is at high risk of recurrent retention. Options were discussed including Robotic simple prostatectomy, HOLEP and PVP/GLL. Pros and cons discussed. At conclusion of the discussion he wished to proceed with PVP/GLL(laser TURP). Procedure described and risks discussed. Consent signed.   Orders:    POCT urine dip auto non-scope    POCT Measure PVR    Cystoscopy    Case request operating room: TRANSURETHRAL RESECTION OF PROSTATE W/ LASER; Standing    CBC and differential; Future    EKG 12 lead; Future    BPH with obstruction/lower urinary tract symptoms  As above  Orders:    POCT urine dip auto non-scope    POCT Measure PVR    Hematuria, unspecified type    Orders:    POCT urine dip auto non-scope    POCT Measure PVR        History of Present Illness   Renan Gonzales is a 84 y.o. male who presents for evaluation of prostatic obstruction and urinary retention. He is on combined therapy with flomax and finasteride. Espinosa removed this AM and he did void successfully. He is here now for cysto and TRUS.    Review of Systems   Constitutional:  Negative for chills, diaphoresis, fatigue and fever.   HENT: Negative.     Eyes: Negative.    Respiratory: Negative.     Cardiovascular: Negative.    Endocrine: Negative.    Genitourinary:         See HPI   Musculoskeletal: Negative.    Skin: Negative.    Allergic/Immunologic: Negative.    Neurological: Negative.    Hematological: Negative.    Psychiatric/Behavioral: Negative.       Current Outpatient Medications on File Prior to Visit   Medication Sig Dispense Refill    albuterol (Albuterol Sulfate) (5 mg/mL) 0.5 % nebulizer solution Inhale 2.5 mg      cetirizine (ZyrTEC) 10 mg tablet  "Take 10 mg by mouth      Cholecalciferol (VITAMIN D) 2000 units CAPS Take by mouth daily      finasteride (PROSCAR) 5 mg tablet Take 1 tablet (5 mg total) by mouth daily 90 tablet 3    fluticasone (FLONASE) 50 mcg/act nasal spray       montelukast (SINGULAIR) 10 mg tablet daily      simvastatin (ZOCOR) 10 mg tablet       tamsulosin (FLOMAX) 0.4 mg Take 1 capsule (0.4 mg total) by mouth daily with dinner 90 capsule 3    albuterol (PROVENTIL HFA,VENTOLIN HFA) 90 mcg/act inhaler Inhale 2 puffs every 4 (four) hours as needed (Patient not taking: Reported on 11/9/2024)      famotidine (PEPCID) 20 mg tablet  (Patient not taking: Reported on 7/11/2024)      ipratropium (ATROVENT) 0.02 % nebulizer solution INHALE 1 VIAL IN NEBULIZER ONCE DAILY AFTER SUPPER      metoprolol tartrate (LOPRESSOR) 25 mg tablet TAKE 1 2 (ONE HALF) TABLET BY MOUTH TWICE DAILY (Patient not taking: Reported on 4/21/2023)      omeprazole (PriLOSEC) 20 mg delayed release capsule  (Patient not taking: Reported on 12/2/2024)      phenazopyridine (PYRIDIUM) 200 mg tablet Take 1 tablet (200 mg total) by mouth 3 (three) times a day with meals (Patient not taking: Reported on 12/2/2024) 10 tablet 0    Qvar RediHaler 40 MCG/ACT inhaler  (Patient not taking: Reported on 12/2/2024)      simvastatin (ZOCOR) 20 mg tablet 10 mg daily   (Patient not taking: Reported on 11/9/2024)      varenicline (CHANTIX YIN) 0.5 MG X 11 & 1 MG X 42 tablet Use as directed. Give with meals and with a full glass of water.       No current facility-administered medications on file prior to visit.         Objective   /68 (BP Location: Left arm, Patient Position: Sitting, Cuff Size: Adult)   Pulse 98   Ht 5' 5\" (1.651 m)   Wt 88.9 kg (196 lb)   SpO2 94%   BMI 32.62 kg/m²     Physical Exam  Vitals reviewed.   Constitutional:       General: He is not in acute distress.     Appearance: Normal appearance. He is well-developed and normal weight. He is not ill-appearing, " "toxic-appearing or diaphoretic.   HENT:      Head: Normocephalic and atraumatic.   Eyes:      General: No scleral icterus.     Conjunctiva/sclera: Conjunctivae normal.   Cardiovascular:      Rate and Rhythm: Normal rate.   Pulmonary:      Effort: Pulmonary effort is normal.   Abdominal:      General: Bowel sounds are normal. There is no distension.      Palpations: Abdomen is soft. There is no mass.      Tenderness: There is no abdominal tenderness. There is no right CVA tenderness, left CVA tenderness, guarding or rebound.      Hernia: No hernia is present.   Genitourinary:     Penis: Normal. No phimosis or hypospadias.       Testes: Normal.         Right: Mass not present.         Left: Mass not present.      Rectum: Normal.      Comments: Large benign feeling prostate  Musculoskeletal:         General: Normal range of motion.      Cervical back: Neck supple.   Skin:     General: Skin is warm and dry.   Neurological:      General: No focal deficit present.      Mental Status: He is alert and oriented to person, place, and time.   Psychiatric:         Mood and Affect: Mood normal.         Behavior: Behavior normal.         Thought Content: Thought content normal.         Judgment: Judgment normal.          Results  Lab Results   Component Value Date    PSA 2.37 09/06/2024    PSA 0.8 11/18/2020     Lab Results   Component Value Date    CALCIUM 9.1 09/06/2024    K 4.1 09/06/2024    CO2 28 09/06/2024     09/06/2024    BUN 18 09/06/2024    CREATININE 0.89 09/06/2024     No results found for: \"WBC\", \"HGB\", \"HCT\", \"MCV\", \"PLT\"    Office Urine Dip  Recent Results (from the past hour)   POCT urine dip auto non-scope    Collection Time: 12/02/24  2:44 PM   Result Value Ref Range     COLOR,UA yellow     CLARITY,UA clear     SPECIFIC GRAVITY,UA 1.005      PH,UA 5.5     LEUKOCYTE ESTERASE,UA tr     NITRITE,UA n     GLUCOSE, UA n     KETONES,UA n     BILIRUBIN,UA n     BLOOD,UA tr     POCT URINE PROTEIN n     SL AMB POCT " UROBILINOGEN 0.2    POCT Measure PVR    Collection Time: 12/02/24  2:49 PM   Result Value Ref Range    POST-VOID RESIDUAL VOLUME, ML POC 25 mL   ]

## 2024-12-02 NOTE — PROGRESS NOTES
Cystoscopy     Date/Time  12/2/2024 2:30 PM     Performed by  Brandyn Rea MD   Authorized by  Brandyn Rea MD     Universal Protocol:  Consent: Verbal consent obtained. Written consent obtained.  Risks and benefits: risks, benefits and alternatives were discussed  Consent given by: patient  Patient understanding: patient states understanding of the procedure being performed  Patient consent: the patient's understanding of the procedure matches consent given  Procedure consent: procedure consent matches procedure scheduled  Patient identity confirmed: verbally with patient      Procedure Details:  Procedure type: cystoscopy    Additional Procedure Details: The patient presents for cystoscopy, transrectal ultrasound and uroflow study for further evaluation of his lower urinary tract symptoms.  I have discussed the reasons for the testing and the potential risks and complications.  Patient expressed understanding, and signed informed consent document.    Flexible Cystoscopy    The patient was carefully  positioned supine on the examining table.  He was then prepped and draped in the usual sterile fashion..  Xylocaine jelly was instilled in the urethra and left  indwelling to affect local anesthesia.  Flexible cystoscopy was then performed with the 15 [place] flexible cystoscope with the following findings.      Urethra: normal    Prostate:  lateral lobes:bilobar obstruction                  median lobe moderate intravesical median lobe    Bladder: mild to mod trabeculation, cath rxn , no tumor, or stones.     Residual urine:small    The patient tolerated the cystoscopy well.  He was placed on his left side in preparation for transrectal ultrasound of prostate.    Transrectal Ultrasound of the Prostate    Transrectal ultrasound was performed with the transrectal probe at 8 megahertz.  Digital rectal examination was performed with findings of large benign feeling prostate.  The lubricated transrectal probe  inserted into the rectum.  The seminal vessels are normal in appearance.  The prostate is enlarged measuring 158 gm. Prostatic height is 6 cm, length 6.8 cm and width 7.43 cm.  No hypoechoic lesion is noted in the peripheral zone.  The transition zone is heterogeneous in echotexture with scattered cysts and calcifications.  A median lobe is    seen.  Visualized bladder is unremarkable.    Impression:  Prostatic enlargement

## 2024-12-02 NOTE — PROGRESS NOTES
"12/2/2024    Renan Gonzales  1940  [id number]    Diagnosis  Chief Complaint    Urinary Retention         Patient presents for whitlock removal managed by Dr. Rea    Plan  Return to see Dr. Rea in the afternoon     Procedure Whitlock removal    Whitlock catheter removed after deflation of an intact balloon. Patient tolerated well. Encouraged patient to hydrate well and return this afternoon for post void residual. He knows he may return early if uncomfortable and unable to urinate. Patient agrees to this plan.    Pt returning to see Dr. Rea in the afternoon 12/2/2024.          Vitals:    12/02/24 0826   BP: 140/68   BP Location: Left arm   Patient Position: Sitting   Cuff Size: Adult   Pulse: 97   SpO2: 90%   Weight: 88.9 kg (196 lb)   Height: 5' 5\" (1.651 m)           Christina Kearney RN       "

## 2024-12-02 NOTE — LETTER
2024     Quentin Rosenthal MD  [address]  [address]  [facility] [address]    Patient: Renan Gonzales   YOB: 1940   Date of Visit: 2024       Dear Dr. Rosenthal:    Thank you for referring Renan Gonzales to me for evaluation. Below are my notes for this consultation.    If you have questions, please do not hesitate to call me. I look forward to following your patient along with you.         Sincerely,        Brandyn Rea MD        CC: No Recipients    Brandyn Rea MD  2024  3:36 PM  Sign when Signing Visit  Name: Renan Gonzales      : 1940      MRN: [id number]  Encounter Provider: Brandyn Rea MD  Encounter Date: 2024   [facility]  :  Assessment & Plan  Enlarged prostate with urinary obstruction  Prostate is very large and obstructing. He is at high risk of recurrent retention. Options were discussed including Robotic simple prostatectomy, HOLEP and PVP/GLL. Pros and cons discussed. At conclusion of the discussion he wished to proceed with PVP/GLL(laser TURP). Procedure described and risks discussed. Consent signed.   Orders:  •  POCT urine dip auto non-scope  •  POCT Measure PVR  •  Cystoscopy  •  Case request operating room: TRANSURETHRAL RESECTION OF PROSTATE W/ LASER; Standing  •  CBC and differential; Future  •  EKG 12 lead; Future    BPH with obstruction/lower urinary tract symptoms  As above  Orders:  •  POCT urine dip auto non-scope  •  POCT Measure PVR    Hematuria, unspecified type    Orders:  •  POCT urine dip auto non-scope  •  POCT Measure PVR        History of Present Illness  Renan Gonzales is a 84 y.o. male who presents for evaluation of prostatic obstruction and urinary retention. He is on combined therapy with flomax and finasteride. Espinosa removed this AM and he did void successfully. He is here now for cysto and TRUS.    Review of Systems   Constitutional:  Negative for chills,  diaphoresis, fatigue and fever.   HENT: Negative.     Eyes: Negative.    Respiratory: Negative.     Cardiovascular: Negative.    Endocrine: Negative.    Genitourinary:         See HPI   Musculoskeletal: Negative.    Skin: Negative.    Allergic/Immunologic: Negative.    Neurological: Negative.    Hematological: Negative.    Psychiatric/Behavioral: Negative.       Current Outpatient Medications on File Prior to Visit   Medication Sig Dispense Refill   • albuterol (Albuterol Sulfate) (5 mg/mL) 0.5 % nebulizer solution Inhale 2.5 mg     • cetirizine (ZyrTEC) 10 mg tablet Take 10 mg by mouth     • Cholecalciferol (VITAMIN D) 2000 units CAPS Take by mouth daily     • finasteride (PROSCAR) 5 mg tablet Take 1 tablet (5 mg total) by mouth daily 90 tablet 3   • fluticasone (FLONASE) 50 mcg/act nasal spray      • montelukast (SINGULAIR) 10 mg tablet daily     • simvastatin (ZOCOR) 10 mg tablet      • tamsulosin (FLOMAX) 0.4 mg Take 1 capsule (0.4 mg total) by mouth daily with dinner 90 capsule 3   • albuterol (PROVENTIL HFA,VENTOLIN HFA) 90 mcg/act inhaler Inhale 2 puffs every 4 (four) hours as needed (Patient not taking: Reported on 11/9/2024)     • famotidine (PEPCID) 20 mg tablet  (Patient not taking: Reported on 7/11/2024)     • ipratropium (ATROVENT) 0.02 % nebulizer solution INHALE 1 VIAL IN NEBULIZER ONCE DAILY AFTER SUPPER     • metoprolol tartrate (LOPRESSOR) 25 mg tablet TAKE 1 2 (ONE HALF) TABLET BY MOUTH TWICE DAILY (Patient not taking: Reported on 4/21/2023)     • omeprazole (PriLOSEC) 20 mg delayed release capsule  (Patient not taking: Reported on 12/2/2024)     • phenazopyridine (PYRIDIUM) 200 mg tablet Take 1 tablet (200 mg total) by mouth 3 (three) times a day with meals (Patient not taking: Reported on 12/2/2024) 10 tablet 0   • Qvar RediHaler 40 MCG/ACT inhaler  (Patient not taking: Reported on 12/2/2024)     • simvastatin (ZOCOR) 20 mg tablet 10 mg daily   (Patient not taking: Reported on 11/9/2024)     •  "varenicline (CHANTIX YIN) 0.5 MG X 11 & 1 MG X 42 tablet Use as directed. Give with meals and with a full glass of water.       No current facility-administered medications on file prior to visit.         Objective  /68 (BP Location: Left arm, Patient Position: Sitting, Cuff Size: Adult)   Pulse 98   Ht 5' 5\" (1.651 m)   Wt 88.9 kg (196 lb)   SpO2 94%   BMI 32.62 kg/m²     Physical Exam  Vitals reviewed.   Constitutional:       General: He is not in acute distress.     Appearance: Normal appearance. He is well-developed and normal weight. He is not ill-appearing, toxic-appearing or diaphoretic.   HENT:      Head: Normocephalic and atraumatic.   Eyes:      General: No scleral icterus.     Conjunctiva/sclera: Conjunctivae normal.   Cardiovascular:      Rate and Rhythm: Normal rate.   Pulmonary:      Effort: Pulmonary effort is normal.   Abdominal:      General: Bowel sounds are normal. There is no distension.      Palpations: Abdomen is soft. There is no mass.      Tenderness: There is no abdominal tenderness. There is no right CVA tenderness, left CVA tenderness, guarding or rebound.      Hernia: No hernia is present.   Genitourinary:     Penis: Normal. No phimosis or hypospadias.       Testes: Normal.         Right: Mass not present.         Left: Mass not present.      Rectum: Normal.      Comments: Large benign feeling prostate  Musculoskeletal:         General: Normal range of motion.      Cervical back: Neck supple.   Skin:     General: Skin is warm and dry.   Neurological:      General: No focal deficit present.      Mental Status: He is alert and oriented to person, place, and time.   Psychiatric:         Mood and Affect: Mood normal.         Behavior: Behavior normal.         Thought Content: Thought content normal.         Judgment: Judgment normal.          Results  Lab Results   Component Value Date    PSA 2.37 09/06/2024    PSA 0.8 11/18/2020     Lab Results   Component Value Date    CALCIUM " "9.1 09/06/2024    K 4.1 09/06/2024    CO2 28 09/06/2024     09/06/2024    BUN 18 09/06/2024    CREATININE 0.89 09/06/2024     No results found for: \"WBC\", \"HGB\", \"HCT\", \"MCV\", \"PLT\"    Office Urine Dip  Recent Results (from the past hour)   POCT urine dip auto non-scope    Collection Time: 12/02/24  2:44 PM   Result Value Ref Range     COLOR,UA yellow     CLARITY,UA clear     SPECIFIC GRAVITY,UA 1.005      PH,UA 5.5     LEUKOCYTE ESTERASE,UA tr     NITRITE,UA n     GLUCOSE, UA n     KETONES,UA n     BILIRUBIN,UA n     BLOOD,UA tr     POCT URINE PROTEIN n     SL AMB POCT UROBILINOGEN 0.2    POCT Measure PVR    Collection Time: 12/02/24  2:49 PM   Result Value Ref Range    POST-VOID RESIDUAL VOLUME, ML POC 25 mL   ]        Brandyn Rea MD  12/2/2024  3:27 PM  Sign when Signing Visit     Cystoscopy     Date/Time  12/2/2024 2:30 PM     Performed by  Brandyn Rea MD   Authorized by  Brandyn Rea MD     Universal Protocol:  Consent: Verbal consent obtained. Written consent obtained.  Risks and benefits: risks, benefits and alternatives were discussed  Consent given by: patient  Patient understanding: patient states understanding of the procedure being performed  Patient consent: the patient's understanding of the procedure matches consent given  Procedure consent: procedure consent matches procedure scheduled  Patient identity confirmed: verbally with patient      Procedure Details:  Procedure type: cystoscopy    Additional Procedure Details: The patient presents for cystoscopy, transrectal ultrasound and uroflow study for further evaluation of his lower urinary tract symptoms.  I have discussed the reasons for the testing and the potential risks and complications.  Patient expressed understanding, and signed informed consent document.    Flexible Cystoscopy    The patient was carefully  positioned supine on the examining table.  He was then prepped and draped in the usual sterile fashion..  Xylocaine " jelly was instilled in the urethra and left  indwelling to affect local anesthesia.  Flexible cystoscopy was then performed with the 15 [place] flexible cystoscope with the following findings.      Urethra: normal    Prostate:  lateral lobes:bilobar obstruction                  median lobe moderate intravesical median lobe    Bladder: mild to mod trabeculation, cath rxn , no tumor, or stones.     Residual urine:small    The patient tolerated the cystoscopy well.  He was placed on his left side in preparation for transrectal ultrasound of prostate.    Transrectal Ultrasound of the Prostate    Transrectal ultrasound was performed with the transrectal probe at 8 megahertz.  Digital rectal examination was performed with findings of large benign feeling prostate.  The lubricated transrectal probe inserted into the rectum.  The seminal vessels are normal in appearance.  The prostate is enlarged measuring 158 gm. Prostatic height is 6 cm, length 6.8 cm and width 7.43 cm.  No hypoechoic lesion is noted in the peripheral zone.  The transition zone is heterogeneous in echotexture with scattered cysts and calcifications.  A median lobe is    seen.  Visualized bladder is unremarkable.    Impression:  Prostatic enlargement

## 2024-12-02 NOTE — PATIENT INSTRUCTIONS
"  Patient Education     Cystoscopy - Discharge instructions   The Basics   Written by the doctors and editors at [facility]   What are discharge instructions? -- Discharge instructions are information about how to take care of yourself after getting medical care for a health problem.  What is a cystoscopy? -- A cystoscopy is a procedure that lets a doctor see inside the bladder and urethra. The urethra is the tube that transports urine out of the bladder (figure 1). During cystoscopy, a doctor puts a thin tube with a tiny camera on the end into the urethra and moves it up into the bladder (figure 2). The tube is called a \"cystoscope.\"  How do I care for myself at home? -- Ask the doctor or nurse what you should do when you go home. Make sure that you understand exactly what you need to do to care for yourself. Ask questions if there is anything you do not understand.  It's important to drink plenty of water:   This helps flush out your bladder and relieves discomfort.   Drink at least 3 to 4 to glasses of water the first day after your procedure.   Urinate when you feel the need.  Some side effects are common after cystoscopy. These should only last for a short time after your procedure. They include:   Some pain or discomfort when urinating   A small amount of blood in your urine  These side effects should stop after you urinate a few times.  What follow-up care do I need? -- Your doctor or nurse will discuss the results of your cystoscopy with you.   If you had a biopsy, your doctor will let you know when your results are ready. They will talk to you about what they mean.   If your cystoscopy shows that you have a medical problem, your doctor will talk to you about your treatment options.  When should I call the doctor? -- Call for advice if you:   Have pain or discomfort when urinating for more than a day or 2   Have \"urinary urgency\" for more than a day or 2 - This is when you feel like you need to urinate " suddenly or in a hurry.   See a lot of blood in your urine at once   Still see blood in your urine after 5 days   Have a fever   Have pain in your belly  All topics are updated as new evidence becomes available and our peer review process is complete.  This topic retrieved from [employer] on: Apr 11, 2024.  Topic 451635 Version 1.0  Release: 32.3.2 - C32.100  © 2024 UpToDate, Inc. and/or its affiliates. All rights reserved.  figure 1: Anatomy of the urinary tract     Urine is made by the kidneys. It passes from the kidneys into the bladder through 2 tubes called the ureters. Then, it leaves the bladder through another tube called the urethra.  Graphic 45987 Version 8.0  figure 2: Cystoscopy     The doctor inserts a long, thin tube with a tiny camera on the end (cystoscope) into the urethra. Then, they thread the tube into the bladder to see inside.  Graphic 580451 Version 1.0  Consumer Information Use and Disclaimer   Disclaimer: This generalized information is a limited summary of diagnosis, treatment, and/or medication information. It is not meant to be comprehensive and should be used as a tool to help the user understand and/or assess potential diagnostic and treatment options. It does NOT include all information about conditions, treatments, medications, side effects, or risks that may apply to a specific patient. It is not intended to be medical advice or a substitute for the medical advice, diagnosis, or treatment of a health care provider based on the health care provider's examination and assessment of a patient's specific and unique circumstances. Patients must speak with a health care provider for complete information about their health, medical questions, and treatment options, including any risks or benefits regarding use of medications. This information does not endorse any treatments or medications as safe, effective, or approved for treating a specific patient. Up[employer]Date, Inc[employer] and its affiliates  disclaim any warranty or liability relating to this information or the use thereof.The use of this information is governed by the Terms of Use, available at https://www.wolterskluwer.com/en/know/clinical-effectiveness-terms. 2024© [employer] and its affiliates and/or licensors. All rights reserved.  Copyright   © 2024 [employer] and/or its affiliates. All rights reserved.

## 2024-12-02 NOTE — LETTER
2024     Quentin Rosenthal MD  [address]  [address]  [facility] [address]    Patient: Renan Gonzales   YOB: 1940   Date of Visit: 2024       Dear Dr. Rosenthal:    Thank you for referring Renan Gonzales to me for evaluation. Below are my notes for this consultation.    If you have questions, please do not hesitate to call me. I look forward to following your patient along with you.         Sincerely,        Brandyn Rea MD        CC: No Recipients    Brandyn Rea MD  2024  3:35 PM  Sign when Signing Visit  Name: Renan Gonzales      : 1940      MRN: [id number]  Encounter Provider: Brandyn Rea MD  Encounter Date: 2024   Encounter department: [facility] UROLOGY [place]  :  Assessment & Plan  Enlarged prostate with urinary obstruction  Prostate is very large and obstructing. He is at high risk of recurrent retention. Options were discussed including Robotic simple prostatectomy, HOLEP and PVP/GLL. Pros and cons discussed. At conclusion of the discussion he wished to proceed with PVP/GLL(laser TURP). Procedure described and risks discussed. Consent signed.   Orders:    POCT urine dip auto non-scope    POCT Measure PVR    Cystoscopy    Case request operating room: TRANSURETHRAL RESECTION OF PROSTATE W/ LASER; Standing    CBC and differential; Future    EKG 12 lead; Future    BPH with obstruction/lower urinary tract symptoms  As above  Orders:    POCT urine dip auto non-scope    POCT Measure PVR    Hematuria, unspecified type    Orders:    POCT urine dip auto non-scope    POCT Measure PVR        History of Present Illness{?Quick Links Encounters * My Last Note * Last Note in Specialty * Snapshot * Since Last Visit * History :57190}  Renan Gonzales is a 84 y.o. male who presents for evaluation of prostatic obstruction and urinary retention. He is on combined therapy with flomax and finasteride. Espinosa removed this AM and he did void  "successfully. He is here now for cysto and TRUS.    Review of Systems   Constitutional:  Negative for chills, diaphoresis, fatigue and fever.   HENT: Negative.     Eyes: Negative.    Respiratory: Negative.     Cardiovascular: Negative.    Endocrine: Negative.    Genitourinary:         See HPI   Musculoskeletal: Negative.    Skin: Negative.    Allergic/Immunologic: Negative.    Neurological: Negative.    Hematological: Negative.    Psychiatric/Behavioral: Negative.       {Select to insert medical history sections (Optional):77613}     Objective{?Quick Links Trend Vitals * Enter New Vitals * Results Review * Timeline (Adult) * Labs * Imaging * Cardiology * Procedures * Lung Cancer Screening * Surgical eConsent :46684}  /68 (BP Location: Left arm, Patient Position: Sitting, Cuff Size: Adult)   Pulse 98   Ht 5' 5\" (1.651 m)   Wt 88.9 kg (196 lb)   SpO2 94%   BMI 32.62 kg/m²     Physical Exam  Vitals reviewed.   Constitutional:       General: He is not in acute distress.     Appearance: Normal appearance. He is well-developed and normal weight. He is not ill-appearing, toxic-appearing or diaphoretic.   HENT:      Head: Normocephalic and atraumatic.   Eyes:      General: No scleral icterus.     Conjunctiva/sclera: Conjunctivae normal.   Cardiovascular:      Rate and Rhythm: Normal rate.   Pulmonary:      Effort: Pulmonary effort is normal.   Abdominal:      General: Bowel sounds are normal. There is no distension.      Palpations: Abdomen is soft. There is no mass.      Tenderness: There is no abdominal tenderness. There is no right CVA tenderness, left CVA tenderness, guarding or rebound.      Hernia: No hernia is present.   Genitourinary:     Penis: Normal. No phimosis or hypospadias.       Testes: Normal.         Right: Mass not present.         Left: Mass not present.      Rectum: Normal.      Comments: Large benign feeling prostate  Musculoskeletal:         General: Normal range of motion.      Cervical " "back: Neck supple.   Skin:     General: Skin is warm and dry.   Neurological:      General: No focal deficit present.      Mental Status: He is alert and oriented to person, place, and time.   Psychiatric:         Mood and Affect: Mood normal.         Behavior: Behavior normal.         Thought Content: Thought content normal.         Judgment: Judgment normal.      ***    Results  Lab Results   Component Value Date    PSA 2.37 09/06/2024    PSA 0.8 11/18/2020     Lab Results   Component Value Date    CALCIUM 9.1 09/06/2024    K 4.1 09/06/2024    CO2 28 09/06/2024     09/06/2024    BUN 18 09/06/2024    CREATININE 0.89 09/06/2024     No results found for: \"WBC\", \"HGB\", \"HCT\", \"MCV\", \"PLT\"    Office Urine Dip  Recent Results (from the past hour)   POCT urine dip auto non-scope    Collection Time: 12/02/24  2:44 PM   Result Value Ref Range     COLOR,UA yellow     CLARITY,UA clear     SPECIFIC GRAVITY,UA 1.005      PH,UA 5.5     LEUKOCYTE ESTERASE,UA tr     NITRITE,UA n     GLUCOSE, UA n     KETONES,UA n     BILIRUBIN,UA n     BLOOD,UA tr     POCT URINE PROTEIN n     SL AMB POCT UROBILINOGEN 0.2    POCT Measure PVR    Collection Time: 12/02/24  2:49 PM   Result Value Ref Range    POST-VOID RESIDUAL VOLUME, ML POC 25 mL   ]    {Administrative / Billing Section (Optional):31335}    Brandyn Rea MD  12/2/2024  3:27 PM  Sign when Signing Visit     Cystoscopy     Date/Time  12/2/2024 2:30 PM     Performed by  Brandyn Rea MD   Authorized by  Brandyn Rea MD     Universal Protocol:  Consent: Verbal consent obtained. Written consent obtained.  Risks and benefits: risks, benefits and alternatives were discussed  Consent given by: patient  Patient understanding: patient states understanding of the procedure being performed  Patient consent: the patient's understanding of the procedure matches consent given  Procedure consent: procedure consent matches procedure scheduled  Patient identity confirmed: verbally " with patient      Procedure Details:  Procedure type: cystoscopy    Additional Procedure Details: The patient presents for cystoscopy, transrectal ultrasound and uroflow study for further evaluation of his lower urinary tract symptoms.  I have discussed the reasons for the testing and the potential risks and complications.  Patient expressed understanding, and signed informed consent document.    Flexible Cystoscopy    The patient was carefully  positioned supine on the examining table.  He was then prepped and draped in the usual sterile fashion..  Xylocaine jelly was instilled in the urethra and left  indwelling to affect local anesthesia.  Flexible cystoscopy was then performed with the 15 Venezuelan flexible cystoscope with the following findings.      Urethra: normal    Prostate:  lateral lobes:bilobar obstruction                  median lobe moderate intravesical median lobe    Bladder: mild to mod trabeculation, cath rxn , no tumor, or stones.     Residual urine:small    The patient tolerated the cystoscopy well.  He was placed on his left side in preparation for transrectal ultrasound of prostate.    Transrectal Ultrasound of the Prostate    Transrectal ultrasound was performed with the transrectal probe at 8 megahertz.  Digital rectal examination was performed with findings of large benign feeling prostate.  The lubricated transrectal probe inserted into the rectum.  The seminal vessels are normal in appearance.  The prostate is enlarged measuring 158 gm. Prostatic height is 6 cm, length 6.8 cm and width 7.43 cm.  No hypoechoic lesion is noted in the peripheral zone.  The transition zone is heterogeneous in echotexture with scattered cysts and calcifications.  A median lobe is    seen.  Visualized bladder is unremarkable.    Impression:  Prostatic enlargement

## 2024-12-09 ENCOUNTER — TELEPHONE (OUTPATIENT)
Dept: UROLOGY | Facility: MEDICAL CENTER | Age: 84
End: 2024-12-09

## 2024-12-09 ENCOUNTER — PREP FOR PROCEDURE (OUTPATIENT)
Dept: UROLOGY | Facility: MEDICAL CENTER | Age: 84
End: 2024-12-09

## 2024-12-09 DIAGNOSIS — Z01.818 OTHER SPECIFIED PRE-OPERATIVE EXAMINATION: ICD-10-CM

## 2024-12-09 DIAGNOSIS — Z01.812 PRE-OPERATIVE LABORATORY EXAMINATION: ICD-10-CM

## 2024-12-09 DIAGNOSIS — E08.00 DIABETES MELLITUS DUE TO UNDERLYING CONDITION WITH HYPEROSMOLARITY WITHOUT COMA, WITHOUT LONG-TERM CURRENT USE OF INSULIN (HCC): ICD-10-CM

## 2024-12-09 DIAGNOSIS — N40.1 BPH WITH OBSTRUCTION/LOWER URINARY TRACT SYMPTOMS: Primary | ICD-10-CM

## 2024-12-09 DIAGNOSIS — Z01.810 PRE-OPERATIVE CARDIOVASCULAR EXAMINATION: ICD-10-CM

## 2024-12-09 DIAGNOSIS — N13.8 BPH WITH OBSTRUCTION/LOWER URINARY TRACT SYMPTOMS: Primary | ICD-10-CM

## 2024-12-09 DIAGNOSIS — R39.89 SUSPECTED UTI: ICD-10-CM

## 2024-12-09 NOTE — TELEPHONE ENCOUNTER
Spoke with patient and confirmed surgery date of 2/6  Type of surgery: Govind HUDSON   Operating physician:Dr. Rea  Location of surgery: [place] [facility]    Verbally went over prep with patient on 12/9  NPO  Bowel prep? No  Hospital calls afternoon prior with arrival time (calls Friday afternoon for Monday surgery)  Patient needs ride to and from surgery   outpatient  Pre-op testing to be done 2 weeks prior to surgery. All testing can be done as a walk-in. EKG can only be done as a walk-in at any [facility].  Labs needed: CBC, BMP, UC, A1C, EKG   Blood thinners:   None  Clearances needed: Cardiac & Medical Clearance     Mailedto patient on 12/9  Copy of packet scanned into Media  Labs in packet and in electronic record   Soap prep in packet  post-op in packet     Consent: in media     Medical Clearance  Appt with: Dr. Rosenthal   Appt date and time: 1/24 @  2:40pm  Date clearance form faxed: 12/9  [facility] fax number: [phone]    Cardiac Clearance  Appt with: Dr. Abbasi  Appt date and time: 12/16/2024  Date clearance form faxed: 12/9  [facility] fax number: [phone]

## 2025-01-23 ENCOUNTER — APPOINTMENT (OUTPATIENT)
Dept: LAB | Facility: HOSPITAL | Age: 85
End: 2025-01-23
Payer: MEDICARE

## 2025-01-23 DIAGNOSIS — I35.9 AORTIC VALVE DISEASE: ICD-10-CM

## 2025-01-23 DIAGNOSIS — N13.8 ENLARGED PROSTATE WITH URINARY OBSTRUCTION: ICD-10-CM

## 2025-01-23 DIAGNOSIS — E08.00 DIABETES MELLITUS DUE TO UNDERLYING CONDITION WITH HYPEROSMOLARITY WITHOUT COMA, WITHOUT LONG-TERM CURRENT USE OF INSULIN (HCC): ICD-10-CM

## 2025-01-23 DIAGNOSIS — N40.1 ENLARGED PROSTATE WITH URINARY OBSTRUCTION: ICD-10-CM

## 2025-01-23 DIAGNOSIS — N13.8 BPH WITH OBSTRUCTION/LOWER URINARY TRACT SYMPTOMS: ICD-10-CM

## 2025-01-23 DIAGNOSIS — N40.1 BPH WITH OBSTRUCTION/LOWER URINARY TRACT SYMPTOMS: ICD-10-CM

## 2025-01-23 DIAGNOSIS — E88.810 METABOLIC SYNDROME: ICD-10-CM

## 2025-01-23 DIAGNOSIS — R31.9 HEMATURIA, UNSPECIFIED TYPE: ICD-10-CM

## 2025-01-23 DIAGNOSIS — Z01.810 PRE-OPERATIVE CARDIOVASCULAR EXAMINATION: ICD-10-CM

## 2025-01-23 DIAGNOSIS — Z01.812 PRE-OPERATIVE LABORATORY EXAMINATION: ICD-10-CM

## 2025-01-23 DIAGNOSIS — R39.89 SUSPECTED UTI: ICD-10-CM

## 2025-01-23 LAB
ANION GAP SERPL CALCULATED.3IONS-SCNC: 5 MMOL/L (ref 4–13)
ATRIAL RATE: 77 BPM
BACTERIA UR QL AUTO: ABNORMAL /HPF
BASOPHILS # BLD AUTO: 0.04 THOUSANDS/ΜL (ref 0–0.1)
BASOPHILS NFR BLD AUTO: 1 % (ref 0–1)
BILIRUB UR QL STRIP: NEGATIVE
BNP SERPL-MCNC: 28 PG/ML (ref 0–100)
BUN SERPL-MCNC: 16 MG/DL (ref 5–25)
CALCIUM SERPL-MCNC: 9.1 MG/DL (ref 8.4–10.2)
CHLORIDE SERPL-SCNC: 104 MMOL/L (ref 96–108)
CHOLEST SERPL-MCNC: 212 MG/DL (ref ?–200)
CLARITY UR: CLEAR
CO2 SERPL-SCNC: 30 MMOL/L (ref 21–32)
COLOR UR: ABNORMAL
CREAT SERPL-MCNC: 0.82 MG/DL (ref 0.6–1.3)
EOSINOPHIL # BLD AUTO: 0.12 THOUSAND/ΜL (ref 0–0.61)
EOSINOPHIL NFR BLD AUTO: 2 % (ref 0–6)
ERYTHROCYTE [DISTWIDTH] IN BLOOD BY AUTOMATED COUNT: 12.3 % (ref 11.6–15.1)
EST. AVERAGE GLUCOSE BLD GHB EST-MCNC: 148 MG/DL
GFR SERPL CREATININE-BSD FRML MDRD: 80 ML/MIN/1.73SQ M
GLUCOSE P FAST SERPL-MCNC: 131 MG/DL (ref 65–99)
GLUCOSE UR STRIP-MCNC: NEGATIVE MG/DL
HBA1C MFR BLD: 6.8 %
HCT VFR BLD AUTO: 44 % (ref 36.5–49.3)
HDLC SERPL-MCNC: 38 MG/DL
HGB BLD-MCNC: 14.6 G/DL (ref 12–17)
HGB UR QL STRIP.AUTO: NEGATIVE
IMM GRANULOCYTES # BLD AUTO: 0.04 THOUSAND/UL (ref 0–0.2)
IMM GRANULOCYTES NFR BLD AUTO: 1 % (ref 0–2)
KETONES UR STRIP-MCNC: NEGATIVE MG/DL
LDLC SERPL CALC-MCNC: 135 MG/DL (ref 0–100)
LEUKOCYTE ESTERASE UR QL STRIP: NEGATIVE
LYMPHOCYTES # BLD AUTO: 2.08 THOUSANDS/ΜL (ref 0.6–4.47)
LYMPHOCYTES NFR BLD AUTO: 33 % (ref 14–44)
MCH RBC QN AUTO: 31.1 PG (ref 26.8–34.3)
MCHC RBC AUTO-ENTMCNC: 33.2 G/DL (ref 31.4–37.4)
MCV RBC AUTO: 94 FL (ref 82–98)
MONOCYTES # BLD AUTO: 0.64 THOUSAND/ΜL (ref 0.17–1.22)
MONOCYTES NFR BLD AUTO: 10 % (ref 4–12)
MUCOUS THREADS UR QL AUTO: ABNORMAL
NEUTROPHILS # BLD AUTO: 3.44 THOUSANDS/ΜL (ref 1.85–7.62)
NEUTS SEG NFR BLD AUTO: 53 % (ref 43–75)
NITRITE UR QL STRIP: NEGATIVE
NON-SQ EPI CELLS URNS QL MICRO: ABNORMAL /HPF
NONHDLC SERPL-MCNC: 174 MG/DL
NRBC BLD AUTO-RTO: 0 /100 WBCS
P AXIS: 62 DEGREES
PH UR STRIP.AUTO: 6.5 [PH]
PLATELET # BLD AUTO: 250 THOUSANDS/UL (ref 149–390)
PMV BLD AUTO: 9.5 FL (ref 8.9–12.7)
POTASSIUM SERPL-SCNC: 3.9 MMOL/L (ref 3.5–5.3)
PR INTERVAL: 170 MS
PROT UR STRIP-MCNC: ABNORMAL MG/DL
QRS AXIS: 132 DEGREES
QRSD INTERVAL: 140 MS
QT INTERVAL: 424 MS
QTC INTERVAL: 480 MS
RBC # BLD AUTO: 4.69 MILLION/UL (ref 3.88–5.62)
RBC #/AREA URNS AUTO: ABNORMAL /HPF
SODIUM SERPL-SCNC: 139 MMOL/L (ref 135–147)
SP GR UR STRIP.AUTO: 1.02 (ref 1–1.03)
T WAVE AXIS: 84 DEGREES
TRIGL SERPL-MCNC: 196 MG/DL (ref ?–150)
UROBILINOGEN UR STRIP-ACNC: <2 MG/DL
VENTRICULAR RATE: 77 BPM
WBC # BLD AUTO: 6.36 THOUSAND/UL (ref 4.31–10.16)
WBC #/AREA URNS AUTO: ABNORMAL /HPF

## 2025-01-23 PROCEDURE — 80048 BASIC METABOLIC PNL TOTAL CA: CPT

## 2025-01-23 PROCEDURE — 36415 COLL VENOUS BLD VENIPUNCTURE: CPT

## 2025-01-23 PROCEDURE — 85025 COMPLETE CBC W/AUTO DIFF WBC: CPT

## 2025-01-23 PROCEDURE — 93010 ELECTROCARDIOGRAM REPORT: CPT | Performed by: STUDENT IN AN ORGANIZED HEALTH CARE EDUCATION/TRAINING PROGRAM

## 2025-01-23 PROCEDURE — 83880 ASSAY OF NATRIURETIC PEPTIDE: CPT

## 2025-01-23 PROCEDURE — 80061 LIPID PANEL: CPT

## 2025-01-23 PROCEDURE — 83036 HEMOGLOBIN GLYCOSYLATED A1C: CPT

## 2025-01-25 LAB
BACTERIA UR CULT: ABNORMAL
BACTERIA UR CULT: ABNORMAL

## 2025-02-04 PROCEDURE — NC001 PR NO CHARGE: Performed by: UROLOGY

## 2025-02-04 RX ORDER — BUDESONIDE 0.5 MG/2ML
0.5 INHALANT ORAL
COMMUNITY

## 2025-02-04 NOTE — PRE-PROCEDURE INSTRUCTIONS
Pre-Surgery Instructions:   Medication Instructions    budesonide (PULMICORT) 0.5 mg/2 mL nebulizer solution Take night before surgery    Cholecalciferol (Vitamin D-3) 125 MCG (5000 UT) TABS Hold day of surgery.    finasteride (PROSCAR) 5 mg tablet Take night before surgery    fluticasone (FLONASE) 50 mcg/act nasal spray Take day of surgery.    ipratropium (ATROVENT) 0.02 % nebulizer solution Take day of surgery.    montelukast (SINGULAIR) 10 mg tablet Take night before surgery    Psyllium (METAMUCIL PO) Hold day of surgery.    tamsulosin (FLOMAX) 0.4 mg Take night before surgery   Medication instructions for day surgery reviewed. Please use only a sip of water to take your instructed medications. Avoid all over the counter vitamins, supplements and NSAIDS for one week prior to surgery per anesthesia guidelines. Tylenol is ok to take as needed.     You will receive a call one business day prior to surgery with an arrival time and hospital directions. If your surgery is scheduled on a Monday, the hospital will be calling you on the Friday prior to your surgery. If you have not heard from anyone by 8pm, please call the hospital supervisor through the hospital  at [phone]. ([place] [phone] or [place] [phone]).    Do not eat or drink anything after midnight the night before your surgery, including candy, mints, lifesavers, or chewing gum. Do not drink alcohol 24hrs before your surgery. Try not to smoke at least 24hrs before your surgery.       Follow the pre surgery showering instructions as listed in the “My Surgical Experience Booklet” or otherwise provided by your surgeon's office. Do not use a blade to shave the surgical area 1 week before surgery. It is okay to use a clean electric clippers up to 24 hours before surgery. Do not apply any lotions, creams, including makeup, cologne, deodorant, or perfumes after showering on the day of your surgery. Do not use dry shampoo, hair spray, hair  gel, or any type of hair products.     No contact lenses, eye make-up, or artificial eyelashes. Remove nail polish, including gel polish, and any artificial, gel, or acrylic nails if possible. Remove all jewelry including rings and body piercing jewelry.     Wear causal clothing that is easy to take on and off. Consider your type of surgery.    Keep any valuables, jewelry, piercings at home. Please bring any specially ordered equipment (sling, braces) if indicated.    Arrange for a responsible person to drive you to and from the hospital on the day of your surgery. Please confirm the visitor policy for the day of your procedure when you receive your phone call with an arrival time.     Call the surgeon's office with any new illnesses, exposures, or additional questions prior to surgery.    Please reference your “My Surgical Experience Booklet” for additional information to prepare for your upcoming surgery.

## 2025-02-04 NOTE — H&P
Assessment & Plan  Enlarged prostate with urinary obstruction  Prostate is very large and obstructing. He is at high risk of recurrent retention. Options were discussed including Robotic simple prostatectomy, HOLEP and PVP/GLL. Pros and cons discussed. At conclusion of the discussion he wished to proceed with PVP/GLL(laser TURP). Procedure described and risks discussed. Consent signed.   Orders:    POCT urine dip auto non-scope    POCT Measure PVR    Cystoscopy    Case request operating room: TRANSURETHRAL RESECTION OF PROSTATE W/ LASER; Standing    CBC and differential; Future    EKG 12 lead; Future     BPH with obstruction/lower urinary tract symptoms  As above  Orders:    POCT urine dip auto non-scope    POCT Measure PVR     Hematuria, unspecified type     Orders:    POCT urine dip auto non-scope    POCT Measure PVR           History of Present Illness     Renan Gonzales is a 84 y.o. male who presents for evaluation of prostatic obstruction and urinary retention. He is on combined therapy with flomax and finasteride. Espinosa removed this AM and he did void successfully. He is here now for cysto and TRUS.     Review of Systems   Constitutional:  Negative for chills, diaphoresis, fatigue and fever.   HENT: Negative.     Eyes: Negative.    Respiratory: Negative.     Cardiovascular: Negative.    Endocrine: Negative.    Genitourinary:         See HPI   Musculoskeletal: Negative.    Skin: Negative.    Allergic/Immunologic: Negative.    Neurological: Negative.    Hematological: Negative.    Psychiatric/Behavioral: Negative.        Medications Ordered Prior to Encounter          Current Outpatient Medications on File Prior to Visit   Medication Sig Dispense Refill    albuterol (Albuterol Sulfate) (5 mg/mL) 0.5 % nebulizer solution Inhale 2.5 mg        cetirizine (ZyrTEC) 10 mg tablet Take 10 mg by mouth        Cholecalciferol (VITAMIN D) 2000 units CAPS Take by mouth daily        finasteride (PROSCAR) 5 mg tablet Take 1  "tablet (5 mg total) by mouth daily 90 tablet 3    fluticasone (FLONASE) 50 mcg/act nasal spray          montelukast (SINGULAIR) 10 mg tablet daily        simvastatin (ZOCOR) 10 mg tablet          tamsulosin (FLOMAX) 0.4 mg Take 1 capsule (0.4 mg total) by mouth daily with dinner 90 capsule 3    albuterol (PROVENTIL HFA,VENTOLIN HFA) 90 mcg/act inhaler Inhale 2 puffs every 4 (four) hours as needed (Patient not taking: Reported on 11/9/2024)        famotidine (PEPCID) 20 mg tablet  (Patient not taking: Reported on 7/11/2024)        ipratropium (ATROVENT) 0.02 % nebulizer solution INHALE 1 VIAL IN NEBULIZER ONCE DAILY AFTER SUPPER        metoprolol tartrate (LOPRESSOR) 25 mg tablet TAKE 1 2 (ONE HALF) TABLET BY MOUTH TWICE DAILY (Patient not taking: Reported on 4/21/2023)        omeprazole (PriLOSEC) 20 mg delayed release capsule  (Patient not taking: Reported on 12/2/2024)        phenazopyridine (PYRIDIUM) 200 mg tablet Take 1 tablet (200 mg total) by mouth 3 (three) times a day with meals (Patient not taking: Reported on 12/2/2024) 10 tablet 0    Qvar RediHaler 40 MCG/ACT inhaler  (Patient not taking: Reported on 12/2/2024)        simvastatin (ZOCOR) 20 mg tablet 10 mg daily   (Patient not taking: Reported on 11/9/2024)        varenicline (CHANTIX YIN) 0.5 MG X 11 & 1 MG X 42 tablet Use as directed. Give with meals and with a full glass of water.          No current facility-administered medications on file prior to visit.            Objective     /68 (BP Location: Left arm, Patient Position: Sitting, Cuff Size: Adult)   Pulse 98   Ht 5' 5\" (1.651 m)   Wt 88.9 kg (196 lb)   SpO2 94%   BMI 32.62 kg/m²      Physical Exam  Vitals reviewed.   Constitutional:       General: He is not in acute distress.     Appearance: Normal appearance. He is well-developed and normal weight. He is not ill-appearing, toxic-appearing or diaphoretic.   HENT:      Head: Normocephalic and atraumatic.   Eyes:      General: No " scleral icterus.     Conjunctiva/sclera: Conjunctivae normal.   Cardiovascular:      Rate and Rhythm: Normal rate.   Pulmonary:      Effort: Pulmonary effort is normal.   Abdominal:      General: Bowel sounds are normal. There is no distension.      Palpations: Abdomen is soft. There is no mass.      Tenderness: There is no abdominal tenderness. There is no right CVA tenderness, left CVA tenderness, guarding or rebound.      Hernia: No hernia is present.   Genitourinary:     Penis: Normal. No phimosis or hypospadias.       Testes: Normal.         Right: Mass not present.         Left: Mass not present.      Rectum: Normal.      Comments: Large benign feeling prostate  Musculoskeletal:         General: Normal range of motion.      Cervical back: Neck supple.   Skin:     General: Skin is warm and dry.   Neurological:      General: No focal deficit present.      Mental Status: He is alert and oriented to person, place, and time.   Psychiatric:         Mood and Affect: Mood normal.         Behavior: Behavior normal.         Thought Content: Thought content normal.         Judgment: Judgment normal.            Results        Lab Results   Component Value Date     PSA 2.37 09/06/2024     PSA 0.8 11/18/2020            Lab Results   Component Value Date     CALCIUM 9.1 09/06/2024     K 4.1 09/06/2024     CO2 28 09/06/2024      09/06/2024     BUN 18 09/06/2024     CREATININE 0.89 09/06/2024

## 2025-02-05 ENCOUNTER — ANESTHESIA EVENT (OUTPATIENT)
Dept: PERIOP | Facility: HOSPITAL | Age: 85
End: 2025-02-05
Payer: MEDICARE

## 2025-02-06 ENCOUNTER — HOSPITAL ENCOUNTER (OUTPATIENT)
Facility: HOSPITAL | Age: 85
Setting detail: OUTPATIENT SURGERY
Discharge: HOME | End: 2025-02-06
Attending: UROLOGY | Admitting: UROLOGY
Payer: MEDICARE

## 2025-02-06 ENCOUNTER — ANESTHESIA (OUTPATIENT)
Dept: PERIOP | Facility: HOSPITAL | Age: 85
End: 2025-02-06
Payer: MEDICARE

## 2025-02-06 ENCOUNTER — TELEPHONE (OUTPATIENT)
Dept: UROLOGY | Facility: MEDICAL CENTER | Age: 85
End: 2025-02-06

## 2025-02-06 VITALS
TEMPERATURE: 98.4 F | WEIGHT: 200.62 LBS | HEART RATE: 76 BPM | SYSTOLIC BLOOD PRESSURE: 133 MMHG | DIASTOLIC BLOOD PRESSURE: 63 MMHG | BODY MASS INDEX: 33.38 KG/M2 | RESPIRATION RATE: 16 BRPM | OXYGEN SATURATION: 93 %

## 2025-02-06 DIAGNOSIS — R30.0 DYSURIA: ICD-10-CM

## 2025-02-06 DIAGNOSIS — N40.1 BPH WITH OBSTRUCTION/LOWER URINARY TRACT SYMPTOMS: Primary | ICD-10-CM

## 2025-02-06 DIAGNOSIS — N13.8 BPH WITH OBSTRUCTION/LOWER URINARY TRACT SYMPTOMS: Primary | ICD-10-CM

## 2025-02-06 PROBLEM — I49.9 DYSRHYTHMIAS: Status: ACTIVE | Noted: 2025-02-06

## 2025-02-06 LAB
GLUCOSE SERPL-MCNC: 146 MG/DL (ref 65–140)
GLUCOSE SERPL-MCNC: 164 MG/DL (ref 65–140)

## 2025-02-06 PROCEDURE — 82948 REAGENT STRIP/BLOOD GLUCOSE: CPT

## 2025-02-06 PROCEDURE — 52648 LASER SURGERY OF PROSTATE: CPT | Performed by: UROLOGY

## 2025-02-06 RX ORDER — FUROSEMIDE 10 MG/ML
INJECTION INTRAMUSCULAR; INTRAVENOUS AS NEEDED
Status: DISCONTINUED | OUTPATIENT
Start: 2025-02-06 | End: 2025-02-06

## 2025-02-06 RX ORDER — SODIUM CHLORIDE, SODIUM LACTATE, POTASSIUM CHLORIDE, CALCIUM CHLORIDE 600; 310; 30; 20 MG/100ML; MG/100ML; MG/100ML; MG/100ML
125 INJECTION, SOLUTION INTRAVENOUS CONTINUOUS
Status: DISCONTINUED | OUTPATIENT
Start: 2025-02-06 | End: 2025-02-06 | Stop reason: HOSPADM

## 2025-02-06 RX ORDER — MAGNESIUM HYDROXIDE 1200 MG/15ML
LIQUID ORAL AS NEEDED
Status: DISCONTINUED | OUTPATIENT
Start: 2025-02-06 | End: 2025-02-06 | Stop reason: HOSPADM

## 2025-02-06 RX ORDER — PHENAZOPYRIDINE HYDROCHLORIDE 200 MG/1
200 TABLET, FILM COATED ORAL 3 TIMES DAILY PRN
Qty: 10 TABLET | Refills: 0 | Status: SHIPPED | OUTPATIENT
Start: 2025-02-06

## 2025-02-06 RX ORDER — LEVOFLOXACIN 5 MG/ML
500 INJECTION, SOLUTION INTRAVENOUS ONCE
Status: COMPLETED | OUTPATIENT
Start: 2025-02-06 | End: 2025-02-06

## 2025-02-06 RX ORDER — FENTANYL CITRATE 50 UG/ML
INJECTION, SOLUTION INTRAMUSCULAR; INTRAVENOUS AS NEEDED
Status: DISCONTINUED | OUTPATIENT
Start: 2025-02-06 | End: 2025-02-06

## 2025-02-06 RX ORDER — OXYCODONE HYDROCHLORIDE 5 MG/1
5 TABLET ORAL EVERY 4 HOURS PRN
Refills: 0 | Status: DISCONTINUED | OUTPATIENT
Start: 2025-02-06 | End: 2025-02-06 | Stop reason: HOSPADM

## 2025-02-06 RX ORDER — PROPOFOL 10 MG/ML
INJECTION, EMULSION INTRAVENOUS AS NEEDED
Status: DISCONTINUED | OUTPATIENT
Start: 2025-02-06 | End: 2025-02-06

## 2025-02-06 RX ORDER — DEXAMETHASONE SODIUM PHOSPHATE 10 MG/ML
INJECTION, SOLUTION INTRAMUSCULAR; INTRAVENOUS AS NEEDED
Status: DISCONTINUED | OUTPATIENT
Start: 2025-02-06 | End: 2025-02-06

## 2025-02-06 RX ORDER — SODIUM CHLORIDE 9 MG/ML
INJECTION, SOLUTION INTRAVENOUS AS NEEDED
Status: DISCONTINUED | OUTPATIENT
Start: 2025-02-06 | End: 2025-02-06 | Stop reason: HOSPADM

## 2025-02-06 RX ORDER — ONDANSETRON 2 MG/ML
INJECTION INTRAMUSCULAR; INTRAVENOUS AS NEEDED
Status: DISCONTINUED | OUTPATIENT
Start: 2025-02-06 | End: 2025-02-06

## 2025-02-06 RX ORDER — ACETAMINOPHEN 10 MG/ML
1000 INJECTION, SOLUTION INTRAVENOUS ONCE
Status: COMPLETED | OUTPATIENT
Start: 2025-02-06 | End: 2025-02-06

## 2025-02-06 RX ORDER — ONDANSETRON 2 MG/ML
4 INJECTION INTRAMUSCULAR; INTRAVENOUS EVERY 6 HOURS PRN
Status: DISCONTINUED | OUTPATIENT
Start: 2025-02-06 | End: 2025-02-06 | Stop reason: HOSPADM

## 2025-02-06 RX ORDER — PHENAZOPYRIDINE HYDROCHLORIDE 100 MG/1
200 TABLET, FILM COATED ORAL ONCE AS NEEDED
Status: COMPLETED | OUTPATIENT
Start: 2025-02-06 | End: 2025-02-06

## 2025-02-06 RX ORDER — LEVOFLOXACIN 250 MG/1
250 TABLET, FILM COATED ORAL DAILY
Qty: 6 TABLET | Refills: 0 | Status: SHIPPED | OUTPATIENT
Start: 2025-02-07 | End: 2025-02-13

## 2025-02-06 RX ADMIN — ACETAMINOPHEN 1000 MG: 10 INJECTION INTRAVENOUS at 11:51

## 2025-02-06 RX ADMIN — ONDANSETRON 4 MG: 2 INJECTION INTRAMUSCULAR; INTRAVENOUS at 11:50

## 2025-02-06 RX ADMIN — DEXAMETHASONE SODIUM PHOSPHATE 10 MG: 10 INJECTION INTRAMUSCULAR; INTRAVENOUS at 11:50

## 2025-02-06 RX ADMIN — OXYCODONE 5 MG: 5 TABLET ORAL at 14:58

## 2025-02-06 RX ADMIN — FENTANYL CITRATE 25 MCG: 50 INJECTION INTRAMUSCULAR; INTRAVENOUS at 12:07

## 2025-02-06 RX ADMIN — FENTANYL CITRATE 25 MCG: 50 INJECTION INTRAMUSCULAR; INTRAVENOUS at 12:20

## 2025-02-06 RX ADMIN — FENTANYL CITRATE 25 MCG: 50 INJECTION INTRAMUSCULAR; INTRAVENOUS at 11:50

## 2025-02-06 RX ADMIN — FENTANYL CITRATE 25 MCG: 50 INJECTION INTRAMUSCULAR; INTRAVENOUS at 11:52

## 2025-02-06 RX ADMIN — PHENAZOPYRIDINE 200 MG: 100 TABLET ORAL at 14:58

## 2025-02-06 RX ADMIN — SODIUM CHLORIDE, SODIUM LACTATE, POTASSIUM CHLORIDE, AND CALCIUM CHLORIDE: .6; .31; .03; .02 INJECTION, SOLUTION INTRAVENOUS at 12:37

## 2025-02-06 RX ADMIN — FENTANYL CITRATE 25 MCG: 50 INJECTION INTRAMUSCULAR; INTRAVENOUS at 12:37

## 2025-02-06 RX ADMIN — LEVOFLOXACIN: 5 INJECTION, SOLUTION INTRAVENOUS at 11:51

## 2025-02-06 RX ADMIN — FENTANYL CITRATE 25 MCG: 50 INJECTION INTRAMUSCULAR; INTRAVENOUS at 11:55

## 2025-02-06 RX ADMIN — FENTANYL CITRATE 25 MCG: 50 INJECTION INTRAMUSCULAR; INTRAVENOUS at 13:17

## 2025-02-06 RX ADMIN — SODIUM CHLORIDE, SODIUM LACTATE, POTASSIUM CHLORIDE, AND CALCIUM CHLORIDE 125 ML/HR: .6; .31; .03; .02 INJECTION, SOLUTION INTRAVENOUS at 10:26

## 2025-02-06 RX ADMIN — PROPOFOL 150 MG: 10 INJECTION, EMULSION INTRAVENOUS at 11:46

## 2025-02-06 RX ADMIN — FENTANYL CITRATE 25 MCG: 50 INJECTION INTRAMUSCULAR; INTRAVENOUS at 12:59

## 2025-02-06 RX ADMIN — FUROSEMIDE 10 MG: 10 INJECTION, SOLUTION INTRAVENOUS at 13:23

## 2025-02-06 NOTE — DISCHARGE INSTR - AVS FIRST PAGE
Rest and drink plenty of fluids.  Use Tylenol around-the-clock for pain.  You can also use ibuprofen.  For bladder discomfort and burning you can use the Pyridium prescribed.  It will turn your urine a deep orange color.  Take the antibiotics prescribed beginning tomorrow.  Continue to take your prostate medications.  It is normal to have some blood in the urine drained from the Espinosa catheter.  The Espinosa should continue to drain.  Call the office for fever, severe pain, heavy bleeding or if the Espinosa catheter does not drain.

## 2025-02-06 NOTE — ANESTHESIA PREPROCEDURE EVALUATION
Procedure:  TRANSURETHRAL RESECTION OF PROSTATE W/ LASER (Urethra)    Relevant Problems   ANESTHESIA (within normal limits)      CARDIO   (+) Dysrhythmias (Complete heart block s/p pacemaker)   (+) Mixed hyperlipidemia      ENDO (within normal limits)      GI/HEPATIC (within normal limits)      /RENAL   (+) BPH with obstruction/lower urinary tract symptoms      PULMONARY   (+) Moderate persistent asthma without complication (Used home nebulizer today)   (+) Obstructive sleep apnea        Physical Exam    Airway  Comment: Large neck  Mallampati score: II  TM Distance: >3 FB  Neck ROM: full     Dental       Cardiovascular  Cardiovascular exam normal    Pulmonary  Pulmonary exam normal     Other Findings        Anesthesia Plan  ASA Score- 3     Anesthesia Type- general with ASA Monitors.         Additional Monitors:     Airway Plan: LMA.           Plan Factors-    Chart reviewed.    Patient summary reviewed.                  Induction- intravenous.    Postoperative Plan-         Informed Consent- Anesthetic plan and risks discussed with patient.        NPO Status:  Vitals Value Taken Time   Date of last liquid 02/06/25 02/06/25 1017   Time of last liquid 0300 02/06/25 1017   Date of last solid 02/05/25 02/06/25 1017   Time of last solid 2100 02/06/25 1017

## 2025-02-06 NOTE — INTERVAL H&P NOTE
H&P reviewed. After examining the patient I find no changes in the patients condition since the H&P had been written.    Vitals:    02/06/25 1017   BP: 152/70   Pulse: 88   Resp: 16   Temp: 97.6 °F (36.4 °C)   SpO2: 96%   Procedure and risks reviewed with patient in the holding unit.

## 2025-02-06 NOTE — TELEPHONE ENCOUNTER
Pt remains inpatient. He is scheduled for whitlock catheter removal on 2- at 9am with the nurse. Once discharged, office will contact pt to confirm FU appointment and review post operative plans

## 2025-02-06 NOTE — ANESTHESIA POSTPROCEDURE EVALUATION
Post-Op Assessment Note    CV Status:  Stable  Pain Score: 0    Pain management: adequate       Mental Status:  Alert and awake   Hydration Status:  Euvolemic   PONV Controlled:  Controlled   Airway Patency:  Patent     Post Op Vitals Reviewed: Yes    No anethesia notable event occurred.    Staff: CRNA           Last Filed PACU Vitals:  Vitals Value Taken Time   Temp 97.2 f    Pulse 79     58    Resp 16    SpO2 97

## 2025-02-06 NOTE — ANESTHESIA POSTPROCEDURE EVALUATION
Post-Op Assessment Note    CV Status:  Stable    Pain management: adequate       Mental Status:  Alert and awake   Hydration Status:  Euvolemic   PONV Controlled:  Controlled   Airway Patency:  Patent     Post Op Vitals Reviewed: Yes    No anethesia notable event occurred.    Staff: Anesthesiologist           Last Filed PACU Vitals:  Vitals Value Taken Time   Temp 98.5 °F (36.9 °C) 02/06/25 1408   Pulse 74 02/06/25 1413   /61 02/06/25 1408   Resp 17 02/06/25 1408   SpO2 92 % 02/06/25 1413   Vitals shown include unfiled device data.    Modified Stacie:     Vitals Value Taken Time   Activity 2 02/06/25 1408   Respiration 2 02/06/25 1408   Circulation 2 02/06/25 1408   Consciousness 2 02/06/25 1408   Oxygen Saturation 2 02/06/25 1408     Modified Stacie Score: 10

## 2025-02-06 NOTE — OP NOTE
OPERATIVE REPORT  PATIENT NAME: Renan Gonzales    :  1940  MRN: [id number]  Pt Location: [facility] OR ROOM 03    SURGERY DATE: 2025    Surgeons and Role:     * Brandyn Rea MD - Primary    Preop Diagnosis:  Enlarged prostate with urinary obstruction [N40.1, N13.8]    Post-Op Diagnosis Codes:     * Enlarged prostate with urinary obstruction [N40.1, N13.8]    Procedure(s):  TRANSURETHRAL RESECTION OF PROSTATE W/ LASER    Specimen(s):  * No specimens in log *    Estimated Blood Loss:   Minimal    Drains:  Urethral Catheter Latex 20 Fr. (Active)   Number of days: 0       Anesthesia Type:   General    Operative Indications:  Enlarged prostate with urinary obstruction [N40.1, N13.8]      Operative Findings:  Normal urethra with a very large obstructing prostate.  Prostate size was greater than 120 g.  Bladder mild to moderately trabeculated.  No stones, tumors or diverticula.  Photo selective vaporization of the prostate was accomplished with creation of an excellent channel for urination.  There was prostate tissue noted distal to the Martha and this was not lasered for fear of affecting the urethral sphincter.  A 24 Sami Espinosa catheter was placed at the conclusion of the procedure with 60 cc in the balloon.    Procedure and Technique:   The patient was brought to the operating room and identified as Renan Gonzales. General anesthesia was administered and the patient was placed in Lithotomy position. The patient was prepped and draped in the usual sterile fashion. Compression boots were employed. IV antibiotics administered and time out performed.  Cystoscopy was performed with the 23 Vietnamese continuous flow cystoscope. Findings are as above. Ureteral orifices and veru montanum were identified. The greenlight laser fiber was placed through the scope. Vaporization began on the bladder neck at 80 ricks power.  The bladder neck was vaporized circumferentially. Right and left lateral lobes were vaporized to level  of capsular fibers. Power was  raised to 180W to ensure adequate vaporization. Anterior and apical tissue were lasered. No vaporization was performed distal to the veru. When vaporization was complete hemostasis was confirmed and any bleeders coagulated. The ureteral orifices were identified and unaffected by laser energy.  587,000 J were utilized. The bladder was left full and cystoscope removed. A 24 F whitlock catheter was placed and balloon filled to 60 cc.  The catheter irrigated well and returned light pink to clear urine. The whitlock was placed to straight drainage.   The patient was awakened from anesthesia. He tolerated the procedure well and was taken to the recovery room in satisfactory condition.    I was present for the entire procedure.       Patient Disposition:  PACU  and hemodynamically stable    Complications:   None               SIGNATURE: Brandyn Rea MD  DATE: February 6, 2025  TIME: 1:43 PM

## 2025-02-06 NOTE — TELEPHONE ENCOUNTER
Patient underwent greenlight laser today.  Espinosa can be removed on Monday morning.  He already has a follow-up appointment with the [facility] in March.  Please call to arrange the voiding trial.

## 2025-02-07 NOTE — TELEPHONE ENCOUNTER
Post Op Note    Renan Gonzales is a 84 y.o. male s/p TRANSURETHRAL RESECTION OF PROSTATE W/ LASER (Urethra)  performed 2-6-2025.  Renan Gonzales is a patient of Dr. Dr. Rea and is seen at the [place] office.     How would you rate your pain on a scale from 1 to 10, 10 being the worst pain ever? 0  Have you had a fever? No  Have your bowel movements been regular? Yes  Do you have any difficulty urinating? No  If the patient has a whitlock- are you comfortable caring for your whitlock? Yes Is it draining urine? Yes      Do you have any other questions or concerns that I can address at this time? Call placed to pt and spoke with him. Pt is doing well s/p procedure. His whitlock catheter is draining well and he is comfortable maintaining until Monday when he is scheduled for removal.   Pt confirmed appointment and is aware he can contact the office with any questions or concerns he should have prior.

## 2025-02-10 ENCOUNTER — PROCEDURE VISIT (OUTPATIENT)
Dept: UROLOGY | Facility: MEDICAL CENTER | Age: 85
End: 2025-02-10
Payer: MEDICARE

## 2025-02-10 VITALS
WEIGHT: 198 LBS | DIASTOLIC BLOOD PRESSURE: 70 MMHG | OXYGEN SATURATION: 93 % | HEIGHT: 65 IN | HEART RATE: 91 BPM | BODY MASS INDEX: 32.99 KG/M2 | SYSTOLIC BLOOD PRESSURE: 130 MMHG

## 2025-02-10 DIAGNOSIS — N40.1 ENLARGED PROSTATE WITH URINARY OBSTRUCTION: Primary | ICD-10-CM

## 2025-02-10 DIAGNOSIS — N13.8 ENLARGED PROSTATE WITH URINARY OBSTRUCTION: Primary | ICD-10-CM

## 2025-02-10 LAB — POST-VOID RESIDUAL VOLUME, ML POC: 0 ML

## 2025-02-10 PROCEDURE — 51798 US URINE CAPACITY MEASURE: CPT

## 2025-02-10 PROCEDURE — 99024 POSTOP FOLLOW-UP VISIT: CPT

## 2025-02-10 NOTE — PROGRESS NOTES
"2/10/2025    Renan Gonzales  1940  [id number]    Diagnosis  Chief Complaint    Post-op         Patient presents for whitlock removal/TOV managed by Dr. Rea    Plan  Return to the office this afternoon for TOV  Return to office for FU appointment 3/7    Procedure Whitlock removal/voiding trial    Whitlock catheter removed after deflation of an intact balloon. Patient tolerated well. Encouraged patient to hydrate well and return this afternoon for post void residual. He knows he may return early if uncomfortable and unable to urinate. Patient agrees to this plan.    Patient returned this afternoon. Patient states able to void. Patient voided while in the office. Bladder ultrasound performed and PVR measured 0 ml.    ED precautions made aware. Advised pt to stay hydrated and if uncomfortable or feel like not urinating enough to give our office a call.        Vitals:    02/10/25 0824   BP: 130/70   BP Location: Left arm   Patient Position: Sitting   Cuff Size: Adult   Pulse: 91   SpO2: 93%   Weight: 89.8 kg (198 lb)   Height: 5' 5\" (1.651 m)           Christina Kearney RN       "

## 2025-03-06 NOTE — PROGRESS NOTES
3/7/2025      Chief Complaint   Patient presents with   • Follow-up         Assessment and Plan    84 y.o. male managed by Dr. Rea       1. BPH with obstruction/lower urinary tract symptoms  Assessment & Plan:  Status post TURP greenlight on 2/6 with Dr. Rea  Pre-op diagnosis enlarged prostate with urinary obstruction  Operative findings-Normal urethra with a very large obstructing prostate. Prostate size was greater than 120 g. Bladder mild to moderately trabeculated. No stones, tumors or diverticula. Photo selective vaporization of the prostate was accomplished with creation of an excellent channel for urination. There was prostate tissue noted distal to the Martha and this was not lasered for fear of affecting the urethral sphincter. A 24 Portuguese Espinosa catheter was placed at the conclusion of the procedure with 60 cc in the balloon.    No complications noted  Espinosa removed 2/10-PVR 0  PVR today- 0   Patient reports being very content with urination   Plan to continue on finasteride due to prostate size  Discussed discontinuing Flomax  If patient feels a weakened urinary stream or inadequate emptying discussed restarting  Plan follow-up in 2 to 3 months with UA and PVR  Orders:  -     POCT Measure PVR          History of Present Illness  Renan Gonzales is a 84 y.o. male here for evaluation of TURP with greenlight on 2/6 with Dr. Becerra.  Preop diagnosis at the time was enlarged prostate with urinary obstruction.  Patient did have elevated PVRs.  Patient was noted to have a very large prostate prior to surgery was on finasteride and Flomax.  Since surgery he reports a great urinary stream and feels that he completely empties his bladder.  He is still currently on the Flomax and finasteride.  He does report some dysuria at the end of urination but reports not drinking that much water recently.  He denies urgency frequency incontinence gross hematuria or flank pain.        Review of Systems   Constitutional:   "Negative for chills and fever.   HENT:  Negative for ear pain and sore throat.    Eyes:  Negative for pain and visual disturbance.   Respiratory:  Negative for cough and shortness of breath.    Cardiovascular:  Negative for chest pain and palpitations.   Gastrointestinal:  Negative for abdominal pain and vomiting.   Genitourinary:  Negative for decreased urine volume, difficulty urinating, dysuria, flank pain, frequency, hematuria and urgency.   Musculoskeletal:  Negative for arthralgias and back pain.   Skin:  Negative for color change and rash.   Neurological:  Negative for seizures and syncope.   All other systems reviewed and are negative.               Vitals  Vitals:    03/07/25 1423   BP: 132/78   BP Location: Left arm   Patient Position: Sitting   Cuff Size: Standard   Pulse: 91   SpO2: 97%   Weight: 90.7 kg (200 lb)   Height: 5' 5\" (1.651 m)       Physical Exam  Vitals reviewed.   Constitutional:       Appearance: Normal appearance.   HENT:      Head: Normocephalic and atraumatic.   Eyes:      Conjunctiva/sclera: Conjunctivae normal.   Pulmonary:      Effort: Pulmonary effort is normal.   Abdominal:      General: Abdomen is flat. There is no distension.      Palpations: Abdomen is soft.      Tenderness: There is no abdominal tenderness.   Musculoskeletal:         General: Normal range of motion.      Cervical back: Normal range of motion.   Skin:     General: Skin is warm and dry.   Neurological:      General: No focal deficit present.      Mental Status: He is alert and oriented to person, place, and time.   Psychiatric:         Mood and Affect: Mood normal.         Behavior: Behavior normal.         Thought Content: Thought content normal.         Judgment: Judgment normal.           Past History  Past Medical History:   Diagnosis Date   • Asthma    • BPH with obstruction/lower urinary tract symptoms    • Candidiasis    • COPD (chronic obstructive pulmonary disease) (HCC)    • CPAP (continuous positive " airway pressure) dependence    • GERD (gastroesophageal reflux disease)    • Obese abdomen    • Sleep apnea    • Uncircumcised male      Social History     Socioeconomic History   • Marital status: /Civil Union     Spouse name: None   • Number of children: None   • Years of education: None   • Highest education level: None   Occupational History   • Occupation:    Tobacco Use   • Smoking status: Former     Current packs/day: 0.00     Average packs/day: 2.0 packs/day for 59.0 years (118.0 ttl pk-yrs)     Types: Cigarettes, Cigars     Start date: 1952     Quit date: 2010     Years since quitting: 15.1   • Smokeless tobacco: Never   • Tobacco comments:     Smokes cigars 1 /mo   Vaping Use   • Vaping status: Never Used   Substance and Sexual Activity   • Alcohol use: Yes     Alcohol/week: 2.0 standard drinks of alcohol     Types: 2 Standard drinks or equivalent per week     Comment: social   • Drug use: No   • Sexual activity: Yes     Partners: Female   Other Topics Concern   • None   Social History Narrative   • None     Social Drivers of Health     Financial Resource Strain: Not on file   Food Insecurity: Not on file   Transportation Needs: Not on file   Physical Activity: Not on file   Stress: Not on file   Social Connections: Not on file   Intimate Partner Violence: Not on file   Housing Stability: Not on file     Social History     Tobacco Use   Smoking Status Former   • Current packs/day: 0.00   • Average packs/day: 2.0 packs/day for 59.0 years (118.0 ttl pk-yrs)   • Types: Cigarettes, Cigars   • Start date: 1952   • Quit date: 2010   • Years since quitting: 15.1   Smokeless Tobacco Never   Tobacco Comments    Smokes cigars 1 /mo     Family History   Problem Relation Age of Onset   • Cancer Brother    • Diabetes Brother        The following portions of the patient's history were reviewed and updated as appropriate: allergies, current medications, past medical history, past social history, past  surgical history and problem list.    Results  Recent Results (from the past hour)   POCT Measure PVR    Collection Time: 03/07/25  2:28 PM   Result Value Ref Range    POST-VOID RESIDUAL VOLUME, ML POC 0 mL   ]  Lab Results   Component Value Date    PSA 2.37 09/06/2024    PSA 0.8 11/18/2020     Lab Results   Component Value Date    CALCIUM 9.1 01/23/2025    K 3.9 01/23/2025    CO2 30 01/23/2025     01/23/2025    BUN 16 01/23/2025    CREATININE 0.82 01/23/2025     Lab Results   Component Value Date    WBC 6.36 01/23/2025    HGB 14.6 01/23/2025    HCT 44.0 01/23/2025    MCV 94 01/23/2025     01/23/2025

## 2025-03-07 ENCOUNTER — OFFICE VISIT (OUTPATIENT)
Dept: UROLOGY | Facility: MEDICAL CENTER | Age: 85
End: 2025-03-07
Payer: MEDICARE

## 2025-03-07 VITALS
HEIGHT: 65 IN | SYSTOLIC BLOOD PRESSURE: 132 MMHG | OXYGEN SATURATION: 97 % | WEIGHT: 200 LBS | BODY MASS INDEX: 33.32 KG/M2 | HEART RATE: 91 BPM | DIASTOLIC BLOOD PRESSURE: 78 MMHG

## 2025-03-07 DIAGNOSIS — N13.8 BPH WITH OBSTRUCTION/LOWER URINARY TRACT SYMPTOMS: Primary | ICD-10-CM

## 2025-03-07 DIAGNOSIS — N40.1 BPH WITH OBSTRUCTION/LOWER URINARY TRACT SYMPTOMS: Primary | ICD-10-CM

## 2025-03-07 LAB — POST-VOID RESIDUAL VOLUME, ML POC: 0 ML

## 2025-03-07 PROCEDURE — 99024 POSTOP FOLLOW-UP VISIT: CPT

## 2025-03-07 PROCEDURE — 51798 US URINE CAPACITY MEASURE: CPT

## 2025-03-07 RX ORDER — PREDNISONE 10 MG/1
TABLET ORAL
COMMUNITY
Start: 2025-02-27

## 2025-03-07 NOTE — ASSESSMENT & PLAN NOTE
Status post TURP greenlight on 2/6 with Dr. Rea  Pre-op diagnosis enlarged prostate with urinary obstruction  Operative findings-Normal urethra with a very large obstructing prostate. Prostate size was greater than 120 g. Bladder mild to moderately trabeculated. No stones, tumors or diverticula. Photo selective vaporization of the prostate was accomplished with creation of an excellent channel for urination. There was prostate tissue noted distal to the Martha and this was not lasered for fear of affecting the urethral sphincter. A 24 [place] Espinosa catheter was placed at the conclusion of the procedure with 60 cc in the balloon.    No complications noted  Espinosa removed 2/10-PVR 0  PVR today- 0   Patient reports being very content with urination   Plan to continue on finasteride due to prostate size  Discussed discontinuing Flomax  If patient feels a weakened urinary stream or inadequate emptying discussed restarting  Plan follow-up in 2 to 3 months with UA and PVR

## 2025-05-16 NOTE — PROGRESS NOTES
5/19/2025      Chief Complaint   Patient presents with   • BPH with obstruction/lower urinary tract symptoms       Assessment and Plan    84 y.o. male managed by Dr. Rea       1. BPH with obstruction/lower urinary tract symptoms  Assessment & Plan:  Status post TURP greenlight on 2/6 with Dr. Rea  Pre-op diagnosis enlarged prostate with urinary obstruction  Operative findings-Normal urethra with a very large obstructing prostate. Prostate size was greater than 120 g. Bn.    No complications noted  UA-large leukocytes- will sent for culture and call with results   PVR today- 17  Plan to continue on finasteride due to prostate size  Plan to trial off of flomax- aware to restart if weakened stream   Plan to follow up in 1 year   Orders:  -     POCT urine dip auto non-scope  -     POCT Measure PVR  -     Urine culture      History of Present Illness  Renan Gonzales is a 84 y.o. male here for evaluation of TURP with greenlight on 2/6 with Dr. Becerra. Preop diagnosis at the time was enlarged prostate with urinary obstruction. Patient did have elevated PVRs. Patient was noted to have a very large prostate prior to surgery was on finasteride and Flomax. Since surgery he reports a great urinary stream and feels that he completely empties his bladder. He is still currently on the Flomax and finasteride. He does report some dysuria at the end of urination but reports not drinking that much water recently. He denies urgency frequency incontinence gross hematuria or flank pain.     Today:   Patient continues to empty his bladder well.  He does report some frequency and urgency but no burning.  He does attribute this to his beverage intake.  He does not drink much water.  He does drink coffee, soda, alcohol.  No gross hematuria, He continues to take Flomax and finasteride.  He plans on trialing off of Flomax.  He reports being content with urination since his TURP.     Review of Systems   Constitutional:  Negative for  "chills and fever.   HENT:  Negative for ear pain and sore throat.    Eyes:  Negative for pain and visual disturbance.   Respiratory:  Negative for cough and shortness of breath.    Cardiovascular:  Negative for chest pain and palpitations.   Gastrointestinal:  Negative for abdominal pain and vomiting.   Genitourinary:  Positive for frequency and urgency. Negative for decreased urine volume, difficulty urinating, dysuria and hematuria.   Musculoskeletal:  Negative for arthralgias and back pain.   Skin:  Negative for color change and rash.   Neurological:  Negative for seizures and syncope.   All other systems reviewed and are negative.               Vitals  Vitals:    05/19/25 1448   BP: 124/66   BP Location: Left arm   Patient Position: Sitting   Cuff Size: Adult   Pulse: 86   SpO2: 95%   Weight: 90.7 kg (200 lb)   Height: 5' 5\" (1.651 m)       Physical Exam  Vitals reviewed.   Constitutional:       Appearance: Normal appearance.   HENT:      Head: Normocephalic and atraumatic.     Eyes:      Conjunctiva/sclera: Conjunctivae normal.     Pulmonary:      Effort: Pulmonary effort is normal.   Abdominal:      General: Abdomen is flat. There is no distension.      Palpations: Abdomen is soft.      Tenderness: There is no abdominal tenderness.     Musculoskeletal:         General: Normal range of motion.      Cervical back: Normal range of motion.     Skin:     General: Skin is warm and dry.     Neurological:      General: No focal deficit present.      Mental Status: He is alert and oriented to person, place, and time.     Psychiatric:         Mood and Affect: Mood normal.         Behavior: Behavior normal.         Thought Content: Thought content normal.         Judgment: Judgment normal.           Past History  Past Medical History:   Diagnosis Date   • Asthma    • BPH with obstruction/lower urinary tract symptoms    • Candidiasis    • COPD (chronic obstructive pulmonary disease) (HCC)    • CPAP (continuous positive " airway pressure) dependence    • GERD (gastroesophageal reflux disease)    • Obese abdomen    • Sleep apnea    • Uncircumcised male      Social History     Socioeconomic History   • Marital status: /Civil Union     Spouse name: None   • Number of children: None   • Years of education: None   • Highest education level: None   Occupational History   • Occupation:    Tobacco Use   • Smoking status: Former     Current packs/day: 0.00     Average packs/day: 2.0 packs/day for 59.0 years (118.0 ttl pk-yrs)     Types: Cigarettes, Cigars     Start date: 1952     Quit date: 2010     Years since quitting: 15.3   • Smokeless tobacco: Never   • Tobacco comments:     Smokes cigars 1 /mo   Vaping Use   • Vaping status: Never Used   Substance and Sexual Activity   • Alcohol use: Yes     Alcohol/week: 2.0 standard drinks of alcohol     Types: 2 Standard drinks or equivalent per week     Comment: social   • Drug use: No   • Sexual activity: Yes     Partners: Female   Other Topics Concern   • None   Social History Narrative   • None     Social Drivers of Health     Financial Resource Strain: Not on file   Food Insecurity: Not on file   Transportation Needs: Not on file   Physical Activity: Not on file   Stress: Not on file   Social Connections: Not on file   Intimate Partner Violence: Not on file   Housing Stability: Not on file     Tobacco Use History[1]  Family History   Problem Relation Age of Onset   • Cancer Brother    • Diabetes Brother        The following portions of the patient's history were reviewed and updated as appropriate: allergies, current medications, past medical history, past social history, past surgical history and problem list.    Results  Recent Results (from the past hour)   POCT Measure PVR    Collection Time: 05/19/25  3:09 PM   Result Value Ref Range    POST-VOID RESIDUAL VOLUME, ML POC 17 mL   POCT urine dip auto non-scope    Collection Time: 05/19/25  3:10 PM   Result Value Ref Range      COLOR,UA yellow     CLARITY,UA cloudy     SPECIFIC GRAVITY,UA 1.020      PH,UA 7.0     LEUKOCYTE ESTERASE,UA large     NITRITE,UA neg     GLUCOSE, UA neg     KETONES,UA neg     BILIRUBIN,UA neg     BLOOD,UA moderate     POCT URINE PROTEIN >=300     SL AMB POCT UROBILINOGEN 0.2    ]  Lab Results   Component Value Date    PSA 2.37 09/06/2024    PSA 0.8 11/18/2020     Lab Results   Component Value Date    CALCIUM 9.2 04/09/2025    K 4.2 04/09/2025    CO2 30 04/09/2025     04/09/2025    BUN 13 04/09/2025    CREATININE 0.85 04/09/2025     Lab Results   Component Value Date    WBC 6.36 01/23/2025    HGB 14.6 01/23/2025    HCT 44.0 01/23/2025    MCV 94 01/23/2025     01/23/2025              [1]  Social History  Tobacco Use   Smoking Status Former   • Current packs/day: 0.00   • Average packs/day: 2.0 packs/day for 59.0 years (118.0 ttl pk-yrs)   • Types: Cigarettes, Cigars   • Start date: 1952   • Quit date: 2010   • Years since quitting: 15.3   Smokeless Tobacco Never   Tobacco Comments    Smokes cigars 1 /mo

## 2025-05-19 ENCOUNTER — OFFICE VISIT (OUTPATIENT)
Dept: UROLOGY | Facility: MEDICAL CENTER | Age: 85
End: 2025-05-19
Payer: MEDICARE

## 2025-05-19 VITALS
WEIGHT: 200 LBS | OXYGEN SATURATION: 95 % | BODY MASS INDEX: 33.32 KG/M2 | DIASTOLIC BLOOD PRESSURE: 66 MMHG | HEIGHT: 65 IN | HEART RATE: 86 BPM | SYSTOLIC BLOOD PRESSURE: 124 MMHG

## 2025-05-19 DIAGNOSIS — N40.1 BPH WITH OBSTRUCTION/LOWER URINARY TRACT SYMPTOMS: Primary | ICD-10-CM

## 2025-05-19 DIAGNOSIS — N13.8 BPH WITH OBSTRUCTION/LOWER URINARY TRACT SYMPTOMS: Primary | ICD-10-CM

## 2025-05-19 LAB
POST-VOID RESIDUAL VOLUME, ML POC: 17 ML
SL AMB  POCT GLUCOSE, UA: ABNORMAL
SL AMB LEUKOCYTE ESTERASE,UA: ABNORMAL
SL AMB POCT BILIRUBIN,UA: ABNORMAL
SL AMB POCT BLOOD,UA: ABNORMAL
SL AMB POCT CLARITY,UA: ABNORMAL
SL AMB POCT COLOR,UA: YELLOW
SL AMB POCT KETONES,UA: ABNORMAL
SL AMB POCT NITRITE,UA: ABNORMAL
SL AMB POCT PH,UA: 7
SL AMB POCT SPECIFIC GRAVITY,UA: 1.02
SL AMB POCT URINE PROTEIN: >=300
SL AMB POCT UROBILINOGEN: 0.2

## 2025-05-19 PROCEDURE — 81003 URINALYSIS AUTO W/O SCOPE: CPT

## 2025-05-19 PROCEDURE — 99213 OFFICE O/P EST LOW 20 MIN: CPT

## 2025-05-19 PROCEDURE — 51798 US URINE CAPACITY MEASURE: CPT

## 2025-05-19 PROCEDURE — 87086 URINE CULTURE/COLONY COUNT: CPT

## 2025-05-19 RX ORDER — DIPHENHYDRAMINE HCL 25 MG
25 CAPSULE ORAL EVERY 6 HOURS PRN
COMMUNITY

## 2025-05-19 RX ORDER — BETAMETHASONE DIPROPIONATE 0.5 MG/G
CREAM TOPICAL 2 TIMES DAILY
COMMUNITY

## 2025-05-19 RX ORDER — OMALIZUMAB 300 MG/2ML
INJECTION, SOLUTION SUBCUTANEOUS
COMMUNITY

## 2025-05-19 NOTE — ASSESSMENT & PLAN NOTE
Status post TURP greenlight on 2/6 with Dr. Rea  Pre-op diagnosis enlarged prostate with urinary obstruction  Operative findings-Normal urethra with a very large obstructing prostate. Prostate size was greater than 120 g. Bn.    No complications noted  UA-large leukocytes- will sent for culture and call with results   PVR today- 17  Plan to continue on finasteride due to prostate size  Plan to trial off of flomax- aware to restart if weakened stream   Plan to follow up in 1 year

## 2025-05-21 ENCOUNTER — RESULTS FOLLOW-UP (OUTPATIENT)
Dept: UROLOGY | Facility: CLINIC | Age: 85
End: 2025-05-21

## 2025-05-21 LAB — BACTERIA UR CULT: NORMAL

## 2025-08-18 DIAGNOSIS — N40.1 BPH WITH OBSTRUCTION/LOWER URINARY TRACT SYMPTOMS: ICD-10-CM

## 2025-08-18 DIAGNOSIS — N13.8 BPH WITH OBSTRUCTION/LOWER URINARY TRACT SYMPTOMS: ICD-10-CM

## 2025-08-18 RX ORDER — FINASTERIDE 5 MG/1
5 TABLET, FILM COATED ORAL DAILY
Qty: 90 TABLET | Refills: 1 | Status: SHIPPED | OUTPATIENT
Start: 2025-08-18

## (undated) DEVICE — Device

## (undated) DEVICE — SCD SEQUENTIAL COMPRESSION COMFORT SLEEVE MEDIUM KNEE LENGTH: Brand: KENDALL SCD

## (undated) DEVICE — UROLOGIC DRAIN BAG: Brand: UNBRANDED

## (undated) DEVICE — TUBING SUCTION 5MM X 12 FT

## (undated) DEVICE — BAG URINE DRAINAGE 2000ML ANTI RFLX LF

## (undated) DEVICE — GLOVE SRG BIOGEL 7.5

## (undated) DEVICE — LUBRICANT JELLY SURGILUBE TUBE 2OZ FLIP TOP

## (undated) DEVICE — 3M™ DURAPORE™ SURGICAL TAPE 1538-3, 3 INCH X 10 YARD (7,5CM X 9,1M), 4 ROLLS/BOX: Brand: 3M™ DURAPORE™

## (undated) DEVICE — PACK TUR

## (undated) DEVICE — LASER FIBER: Brand: MOXY

## (undated) DEVICE — PREMIUM DRY TRAY LF: Brand: MEDLINE INDUSTRIES, INC.

## (undated) DEVICE — IV SET 15 DROP 3/Y GRAVITY CARESITE

## (undated) DEVICE — 3M™ STERI-STRIP™ COMPOUND BENZOIN TINCTURE 40 BAGS/CARTON 4 CARTONS/CASE C1544: Brand: 3M™ STERI-STRIP™

## (undated) DEVICE — INVIEW CLEAR LEGGINGS: Brand: CONVERTORS

## (undated) DEVICE — SINGLE PORT MANIFOLD: Brand: NEPTUNE 2